# Patient Record
Sex: FEMALE | Race: WHITE | NOT HISPANIC OR LATINO | URBAN - METROPOLITAN AREA
[De-identification: names, ages, dates, MRNs, and addresses within clinical notes are randomized per-mention and may not be internally consistent; named-entity substitution may affect disease eponyms.]

---

## 2018-01-01 ENCOUNTER — OUTPATIENT (OUTPATIENT)
Dept: OUTPATIENT SERVICES | Facility: HOSPITAL | Age: 78
LOS: 1 days | End: 2018-01-01

## 2018-01-01 ENCOUNTER — INPATIENT (INPATIENT)
Facility: HOSPITAL | Age: 78
LOS: 4 days | Discharge: HOME | End: 2018-06-18
Admitting: FAMILY MEDICINE
Payer: MEDICARE

## 2018-01-01 ENCOUNTER — MOBILE ON CALL (OUTPATIENT)
Age: 78
End: 2018-01-01

## 2018-01-01 ENCOUNTER — EMERGENCY (EMERGENCY)
Facility: HOSPITAL | Age: 78
LOS: 1 days | End: 2018-01-01
Payer: MEDICARE

## 2018-01-01 ENCOUNTER — TRANSCRIPTION ENCOUNTER (OUTPATIENT)
Age: 78
End: 2018-01-01

## 2018-01-01 ENCOUNTER — INPATIENT (INPATIENT)
Facility: HOSPITAL | Age: 78
LOS: 0 days | Discharge: ANOTHER HOSPITAL | End: 2018-12-02
Payer: MEDICARE

## 2018-01-01 ENCOUNTER — INPATIENT (INPATIENT)
Facility: HOSPITAL | Age: 78
LOS: 5 days | DRG: 208 | End: 2018-12-08
Attending: HOSPITALIST | Admitting: HOSPITALIST
Payer: MEDICARE

## 2018-01-01 VITALS
OXYGEN SATURATION: 100 % | WEIGHT: 181.88 LBS | SYSTOLIC BLOOD PRESSURE: 115 MMHG | DIASTOLIC BLOOD PRESSURE: 70 MMHG | TEMPERATURE: 96 F | RESPIRATION RATE: 31 BRPM | HEART RATE: 126 BPM

## 2018-01-01 VITALS — RESPIRATION RATE: 14 BRPM | OXYGEN SATURATION: 89 %

## 2018-01-01 DIAGNOSIS — C85.90 NON-HODGKIN LYMPHOMA, UNSPECIFIED, UNSPECIFIED SITE: ICD-10-CM

## 2018-01-01 DIAGNOSIS — J95.821 ACUTE POSTPROCEDURAL RESPIRATORY FAILURE: ICD-10-CM

## 2018-01-01 DIAGNOSIS — R09.89 OTHER SPECIFIED SYMPTOMS AND SIGNS INVOLVING THE CIRCULATORY AND RESPIRATORY SYSTEMS: ICD-10-CM

## 2018-01-01 DIAGNOSIS — K72.00 ACUTE AND SUBACUTE HEPATIC FAILURE WITHOUT COMA: ICD-10-CM

## 2018-01-01 DIAGNOSIS — I26.99 OTHER PULMONARY EMBOLISM WITHOUT ACUTE COR PULMONALE: ICD-10-CM

## 2018-01-01 DIAGNOSIS — E87.2 ACIDOSIS: ICD-10-CM

## 2018-01-01 DIAGNOSIS — S36.039A UNSPECIFIED LACERATION OF SPLEEN, INITIAL ENCOUNTER: ICD-10-CM

## 2018-01-01 DIAGNOSIS — M35.00 SJOGREN SYNDROME, UNSPECIFIED: ICD-10-CM

## 2018-01-01 DIAGNOSIS — Z90.2 ACQUIRED ABSENCE OF LUNG [PART OF]: Chronic | ICD-10-CM

## 2018-01-01 DIAGNOSIS — N17.9 ACUTE KIDNEY FAILURE, UNSPECIFIED: ICD-10-CM

## 2018-01-01 DIAGNOSIS — A41.51 SEPSIS DUE TO ESCHERICHIA COLI [E. COLI]: ICD-10-CM

## 2018-01-01 DIAGNOSIS — R57.9 SHOCK, UNSPECIFIED: ICD-10-CM

## 2018-01-01 DIAGNOSIS — D62 ACUTE POSTHEMORRHAGIC ANEMIA: ICD-10-CM

## 2018-01-01 DIAGNOSIS — K66.1 HEMOPERITONEUM: ICD-10-CM

## 2018-01-01 DIAGNOSIS — S22.42XA MULTIPLE FRACTURES OF RIBS, LEFT SIDE, INITIAL ENCOUNTER FOR CLOSED FRACTURE: ICD-10-CM

## 2018-01-01 DIAGNOSIS — N39.0 URINARY TRACT INFECTION, SITE NOT SPECIFIED: ICD-10-CM

## 2018-01-01 DIAGNOSIS — J96.00 ACUTE RESPIRATORY FAILURE, UNSPECIFIED WHETHER WITH HYPOXIA OR HYPERCAPNIA: ICD-10-CM

## 2018-01-01 DIAGNOSIS — N17.0 ACUTE KIDNEY FAILURE WITH TUBULAR NECROSIS: ICD-10-CM

## 2018-01-01 DIAGNOSIS — R41.0 DISORIENTATION, UNSPECIFIED: ICD-10-CM

## 2018-01-01 DIAGNOSIS — I48.91 UNSPECIFIED ATRIAL FIBRILLATION: ICD-10-CM

## 2018-01-01 DIAGNOSIS — I27.82 CHRONIC PULMONARY EMBOLISM: ICD-10-CM

## 2018-01-01 DIAGNOSIS — I26.09 OTHER PULMONARY EMBOLISM WITH ACUTE COR PULMONALE: ICD-10-CM

## 2018-01-01 DIAGNOSIS — R74.0 NONSPECIFIC ELEVATION OF LEVELS OF TRANSAMINASE AND LACTIC ACID DEHYDROGENASE [LDH]: ICD-10-CM

## 2018-01-01 LAB
ALBUMIN SERPL ELPH-MCNC: 1.4 G/DL — LOW (ref 3.3–5.2)
ALBUMIN SERPL ELPH-MCNC: 1.5 G/DL — LOW (ref 3.3–5.2)
ALBUMIN SERPL ELPH-MCNC: 1.6 G/DL — LOW (ref 3.3–5.2)
ALBUMIN SERPL ELPH-MCNC: 1.7 G/DL — LOW (ref 3.3–5.2)
ALBUMIN SERPL ELPH-MCNC: 1.8 G/DL — LOW (ref 3.3–5.2)
ALBUMIN SERPL ELPH-MCNC: 1.9 G/DL — LOW (ref 3.3–5.2)
ALP SERPL-CCNC: 101 U/L — SIGNIFICANT CHANGE UP (ref 40–120)
ALP SERPL-CCNC: 105 U/L — SIGNIFICANT CHANGE UP (ref 40–120)
ALP SERPL-CCNC: 152 U/L — HIGH (ref 40–120)
ALP SERPL-CCNC: 191 U/L — HIGH (ref 40–120)
ALP SERPL-CCNC: 58 U/L — SIGNIFICANT CHANGE UP (ref 40–120)
ALP SERPL-CCNC: 58 U/L — SIGNIFICANT CHANGE UP (ref 40–120)
ALP SERPL-CCNC: 68 U/L — SIGNIFICANT CHANGE UP (ref 40–120)
ALP SERPL-CCNC: 69 U/L — SIGNIFICANT CHANGE UP (ref 40–120)
ALT FLD-CCNC: 1498 U/L — HIGH
ALT FLD-CCNC: 1607 U/L — HIGH
ALT FLD-CCNC: 1749 U/L — HIGH
ALT FLD-CCNC: 1885 U/L — HIGH
ALT FLD-CCNC: 1926 U/L — HIGH
ALT FLD-CCNC: 361 U/L — HIGH
ALT FLD-CCNC: 489 U/L — HIGH
ALT FLD-CCNC: 981 U/L — HIGH
AMMONIA BLD-MCNC: 38 UMOL/L — SIGNIFICANT CHANGE UP (ref 11–55)
AMMONIA BLD-MCNC: 48 UMOL/L — SIGNIFICANT CHANGE UP (ref 11–55)
AMMONIA BLD-MCNC: 59 UMOL/L — HIGH (ref 11–55)
AMYLASE FLD-CCNC: 250 U/L — SIGNIFICANT CHANGE UP
ANION GAP SERPL CALC-SCNC: 14 MMOL/L — SIGNIFICANT CHANGE UP (ref 5–17)
ANION GAP SERPL CALC-SCNC: 18 MMOL/L — HIGH (ref 5–17)
ANION GAP SERPL CALC-SCNC: 21 MMOL/L — HIGH (ref 5–17)
ANION GAP SERPL CALC-SCNC: 21 MMOL/L — HIGH (ref 5–17)
ANION GAP SERPL CALC-SCNC: 22 MMOL/L — HIGH (ref 5–17)
ANION GAP SERPL CALC-SCNC: 23 MMOL/L — HIGH (ref 5–17)
ANION GAP SERPL CALC-SCNC: 24 MMOL/L — HIGH (ref 5–17)
APAP SERPL-MCNC: <7.5 UG/ML — LOW (ref 10–26)
APPEARANCE UR: ABNORMAL
APTT BLD: 106.6 SEC — HIGH (ref 27.5–36.3)
APTT BLD: 108.7 SEC — HIGH (ref 27.5–36.3)
APTT BLD: 125 SEC — CRITICAL HIGH (ref 27.5–36.3)
APTT BLD: 144.6 SEC — CRITICAL HIGH (ref 27.5–36.3)
APTT BLD: 188.4 SEC — CRITICAL HIGH (ref 27.5–36.3)
APTT BLD: 36.8 SEC — HIGH (ref 27.5–36.3)
APTT BLD: 38.3 SEC — HIGH (ref 27.5–36.3)
APTT BLD: 39.6 SEC — HIGH (ref 27.5–36.3)
APTT BLD: 52.4 SEC — HIGH (ref 27.5–36.3)
APTT BLD: 54.4 SEC — HIGH (ref 27.5–36.3)
APTT BLD: 55.8 SEC — HIGH (ref 27.5–36.3)
APTT BLD: 59.1 SEC — HIGH (ref 27.5–36.3)
APTT BLD: 60.2 SEC — HIGH (ref 27.5–36.3)
APTT BLD: 60.7 SEC — HIGH (ref 27.5–36.3)
APTT BLD: 61.1 SEC — HIGH (ref 27.5–36.3)
APTT BLD: 63.7 SEC — HIGH (ref 27.5–36.3)
APTT BLD: 66.7 SEC — HIGH (ref 27.5–36.3)
APTT BLD: 68.2 SEC — HIGH (ref 27.5–36.3)
APTT BLD: 74.3 SEC — HIGH (ref 27.5–36.3)
APTT BLD: 76.1 SEC — HIGH (ref 27.5–36.3)
APTT BLD: >200 SEC — CRITICAL HIGH (ref 27.5–36.3)
AST SERPL-CCNC: 208 U/L — HIGH
AST SERPL-CCNC: 2165 U/L — HIGH
AST SERPL-CCNC: 329 U/L — HIGH
AST SERPL-CCNC: 3570 U/L — HIGH
AST SERPL-CCNC: 3782 U/L — HIGH
AST SERPL-CCNC: 4411 U/L — HIGH
AST SERPL-CCNC: 4895 U/L — HIGH
AST SERPL-CCNC: 985 U/L — HIGH
BACTERIA # UR AUTO: ABNORMAL
BASOPHILS # BLD AUTO: 0 K/UL — SIGNIFICANT CHANGE UP (ref 0–0.2)
BASOPHILS NFR BLD AUTO: 0 % — SIGNIFICANT CHANGE UP (ref 0–2)
BILIRUB DIRECT SERPL-MCNC: 3 MG/DL — HIGH (ref 0–0.3)
BILIRUB DIRECT SERPL-MCNC: 5.5 MG/DL — HIGH (ref 0–0.3)
BILIRUB INDIRECT FLD-MCNC: 0.7 MG/DL — SIGNIFICANT CHANGE UP (ref 0.2–1)
BILIRUB INDIRECT FLD-MCNC: 0.9 MG/DL — SIGNIFICANT CHANGE UP (ref 0.2–1)
BILIRUB SERPL-MCNC: 2 MG/DL — SIGNIFICANT CHANGE UP (ref 0.4–2)
BILIRUB SERPL-MCNC: 2.3 MG/DL — HIGH (ref 0.4–2)
BILIRUB SERPL-MCNC: 2.4 MG/DL — HIGH (ref 0.4–2)
BILIRUB SERPL-MCNC: 2.6 MG/DL — HIGH (ref 0.4–2)
BILIRUB SERPL-MCNC: 3.2 MG/DL — HIGH (ref 0.4–2)
BILIRUB SERPL-MCNC: 3.7 MG/DL — HIGH (ref 0.4–2)
BILIRUB SERPL-MCNC: 4.9 MG/DL — HIGH (ref 0.4–2)
BILIRUB SERPL-MCNC: 6.4 MG/DL — HIGH (ref 0.4–2)
BILIRUB SERPL-MCNC: 6.4 MG/DL — HIGH (ref 0.4–2)
BILIRUB UR-MCNC: ABNORMAL
BLD GP AB SCN SERPL QL: SIGNIFICANT CHANGE UP
BUN SERPL-MCNC: 28 MG/DL — HIGH (ref 8–20)
BUN SERPL-MCNC: 29 MG/DL — HIGH (ref 8–20)
BUN SERPL-MCNC: 31 MG/DL — HIGH (ref 8–20)
BUN SERPL-MCNC: 31 MG/DL — HIGH (ref 8–20)
BUN SERPL-MCNC: 37 MG/DL — HIGH (ref 8–20)
BUN SERPL-MCNC: 45 MG/DL — HIGH (ref 8–20)
BUN SERPL-MCNC: 54 MG/DL — HIGH (ref 8–20)
BUN SERPL-MCNC: 63 MG/DL — HIGH (ref 8–20)
BUN SERPL-MCNC: 68 MG/DL — HIGH (ref 8–20)
CALCIUM SERPL-MCNC: 6.2 MG/DL — CRITICAL LOW (ref 8.6–10.2)
CALCIUM SERPL-MCNC: 6.3 MG/DL — CRITICAL LOW (ref 8.6–10.2)
CALCIUM SERPL-MCNC: 6.6 MG/DL — LOW (ref 8.6–10.2)
CALCIUM SERPL-MCNC: 6.8 MG/DL — LOW (ref 8.6–10.2)
CALCIUM SERPL-MCNC: 7.1 MG/DL — LOW (ref 8.6–10.2)
CALCIUM SERPL-MCNC: 7.7 MG/DL — LOW (ref 8.6–10.2)
CALCIUM SERPL-MCNC: 8.5 MG/DL — LOW (ref 8.6–10.2)
CALCIUM SERPL-MCNC: 8.6 MG/DL — SIGNIFICANT CHANGE UP (ref 8.6–10.2)
CALCIUM SERPL-MCNC: 8.7 MG/DL — SIGNIFICANT CHANGE UP (ref 8.6–10.2)
CHLORIDE SERPL-SCNC: 100 MMOL/L — SIGNIFICANT CHANGE UP (ref 98–107)
CHLORIDE SERPL-SCNC: 101 MMOL/L — SIGNIFICANT CHANGE UP (ref 98–107)
CHLORIDE SERPL-SCNC: 101 MMOL/L — SIGNIFICANT CHANGE UP (ref 98–107)
CHLORIDE SERPL-SCNC: 106 MMOL/L — SIGNIFICANT CHANGE UP (ref 98–107)
CHLORIDE SERPL-SCNC: 106 MMOL/L — SIGNIFICANT CHANGE UP (ref 98–107)
CHLORIDE SERPL-SCNC: 93 MMOL/L — LOW (ref 98–107)
CHLORIDE SERPL-SCNC: 95 MMOL/L — LOW (ref 98–107)
CHLORIDE SERPL-SCNC: 96 MMOL/L — LOW (ref 98–107)
CHLORIDE SERPL-SCNC: 97 MMOL/L — LOW (ref 98–107)
CK MB CFR SERPL CALC: 12.8 NG/ML — HIGH (ref 0–6.7)
CK SERPL-CCNC: 810 U/L — HIGH (ref 25–170)
CO2 SERPL-SCNC: 19 MMOL/L — LOW (ref 22–29)
CO2 SERPL-SCNC: 19 MMOL/L — LOW (ref 22–29)
CO2 SERPL-SCNC: 20 MMOL/L — LOW (ref 22–29)
CO2 SERPL-SCNC: 21 MMOL/L — LOW (ref 22–29)
CO2 SERPL-SCNC: 22 MMOL/L — SIGNIFICANT CHANGE UP (ref 22–29)
CO2 SERPL-SCNC: 22 MMOL/L — SIGNIFICANT CHANGE UP (ref 22–29)
CO2 SERPL-SCNC: 24 MMOL/L — SIGNIFICANT CHANGE UP (ref 22–29)
COLOR SPEC: ABNORMAL
CREAT SERPL-MCNC: 1.55 MG/DL — HIGH (ref 0.5–1.3)
CREAT SERPL-MCNC: 1.59 MG/DL — HIGH (ref 0.5–1.3)
CREAT SERPL-MCNC: 1.72 MG/DL — HIGH (ref 0.5–1.3)
CREAT SERPL-MCNC: 1.87 MG/DL — HIGH (ref 0.5–1.3)
CREAT SERPL-MCNC: 2.1 MG/DL — HIGH (ref 0.5–1.3)
CREAT SERPL-MCNC: 2.3 MG/DL — HIGH (ref 0.5–1.3)
CREAT SERPL-MCNC: 2.47 MG/DL — HIGH (ref 0.5–1.3)
CREAT SERPL-MCNC: 2.59 MG/DL — HIGH (ref 0.5–1.3)
CREAT SERPL-MCNC: 2.69 MG/DL — HIGH (ref 0.5–1.3)
CULTURE RESULTS: NO GROWTH — SIGNIFICANT CHANGE UP
CULTURE RESULTS: SIGNIFICANT CHANGE UP
CULTURE RESULTS: SIGNIFICANT CHANGE UP
DIFF PNL FLD: ABNORMAL
EOSINOPHIL # BLD AUTO: 0 K/UL — SIGNIFICANT CHANGE UP (ref 0–0.5)
EOSINOPHIL NFR BLD AUTO: 0 % — SIGNIFICANT CHANGE UP (ref 0–6)
EPI CELLS # UR: SIGNIFICANT CHANGE UP
FIBRINOGEN PPP-MCNC: 492 MG/DL — SIGNIFICANT CHANGE UP (ref 350–510)
GAS PNL BLDA: SIGNIFICANT CHANGE UP
GLUCOSE BLDC GLUCOMTR-MCNC: 106 MG/DL — HIGH (ref 70–99)
GLUCOSE BLDC GLUCOMTR-MCNC: 107 MG/DL — HIGH (ref 70–99)
GLUCOSE BLDC GLUCOMTR-MCNC: 112 MG/DL — HIGH (ref 70–99)
GLUCOSE BLDC GLUCOMTR-MCNC: 113 MG/DL — HIGH (ref 70–99)
GLUCOSE BLDC GLUCOMTR-MCNC: 114 MG/DL — HIGH (ref 70–99)
GLUCOSE BLDC GLUCOMTR-MCNC: 116 MG/DL — HIGH (ref 70–99)
GLUCOSE BLDC GLUCOMTR-MCNC: 116 MG/DL — HIGH (ref 70–99)
GLUCOSE BLDC GLUCOMTR-MCNC: 119 MG/DL — HIGH (ref 70–99)
GLUCOSE BLDC GLUCOMTR-MCNC: 119 MG/DL — HIGH (ref 70–99)
GLUCOSE BLDC GLUCOMTR-MCNC: 120 MG/DL — HIGH (ref 70–99)
GLUCOSE BLDC GLUCOMTR-MCNC: 120 MG/DL — HIGH (ref 70–99)
GLUCOSE BLDC GLUCOMTR-MCNC: 121 MG/DL — HIGH (ref 70–99)
GLUCOSE BLDC GLUCOMTR-MCNC: 123 MG/DL — HIGH (ref 70–99)
GLUCOSE BLDC GLUCOMTR-MCNC: 125 MG/DL — HIGH (ref 70–99)
GLUCOSE BLDC GLUCOMTR-MCNC: 126 MG/DL — HIGH (ref 70–99)
GLUCOSE BLDC GLUCOMTR-MCNC: 127 MG/DL — HIGH (ref 70–99)
GLUCOSE BLDC GLUCOMTR-MCNC: 129 MG/DL — HIGH (ref 70–99)
GLUCOSE BLDC GLUCOMTR-MCNC: 132 MG/DL — HIGH (ref 70–99)
GLUCOSE BLDC GLUCOMTR-MCNC: 133 MG/DL — HIGH (ref 70–99)
GLUCOSE BLDC GLUCOMTR-MCNC: 134 MG/DL — HIGH (ref 70–99)
GLUCOSE BLDC GLUCOMTR-MCNC: 135 MG/DL — HIGH (ref 70–99)
GLUCOSE BLDC GLUCOMTR-MCNC: 137 MG/DL — HIGH (ref 70–99)
GLUCOSE BLDC GLUCOMTR-MCNC: 138 MG/DL — HIGH (ref 70–99)
GLUCOSE BLDC GLUCOMTR-MCNC: 139 MG/DL — HIGH (ref 70–99)
GLUCOSE BLDC GLUCOMTR-MCNC: 141 MG/DL — HIGH (ref 70–99)
GLUCOSE BLDC GLUCOMTR-MCNC: 146 MG/DL — HIGH (ref 70–99)
GLUCOSE BLDC GLUCOMTR-MCNC: 148 MG/DL — HIGH (ref 70–99)
GLUCOSE BLDC GLUCOMTR-MCNC: 150 MG/DL — HIGH (ref 70–99)
GLUCOSE BLDC GLUCOMTR-MCNC: 153 MG/DL — HIGH (ref 70–99)
GLUCOSE BLDC GLUCOMTR-MCNC: 159 MG/DL — HIGH (ref 70–99)
GLUCOSE BLDC GLUCOMTR-MCNC: 168 MG/DL — HIGH (ref 70–99)
GLUCOSE BLDC GLUCOMTR-MCNC: 168 MG/DL — HIGH (ref 70–99)
GLUCOSE BLDC GLUCOMTR-MCNC: 178 MG/DL — HIGH (ref 70–99)
GLUCOSE BLDC GLUCOMTR-MCNC: 223 MG/DL — HIGH (ref 70–99)
GLUCOSE BLDC GLUCOMTR-MCNC: 55 MG/DL — LOW (ref 70–99)
GLUCOSE BLDC GLUCOMTR-MCNC: 58 MG/DL — LOW (ref 70–99)
GLUCOSE BLDC GLUCOMTR-MCNC: 66 MG/DL — LOW (ref 70–99)
GLUCOSE BLDC GLUCOMTR-MCNC: 68 MG/DL — LOW (ref 70–99)
GLUCOSE BLDC GLUCOMTR-MCNC: 72 MG/DL — SIGNIFICANT CHANGE UP (ref 70–99)
GLUCOSE BLDC GLUCOMTR-MCNC: 83 MG/DL — SIGNIFICANT CHANGE UP (ref 70–99)
GLUCOSE BLDC GLUCOMTR-MCNC: 83 MG/DL — SIGNIFICANT CHANGE UP (ref 70–99)
GLUCOSE BLDC GLUCOMTR-MCNC: 84 MG/DL — SIGNIFICANT CHANGE UP (ref 70–99)
GLUCOSE BLDC GLUCOMTR-MCNC: 88 MG/DL — SIGNIFICANT CHANGE UP (ref 70–99)
GLUCOSE BLDC GLUCOMTR-MCNC: 91 MG/DL — SIGNIFICANT CHANGE UP (ref 70–99)
GLUCOSE BLDC GLUCOMTR-MCNC: 92 MG/DL — SIGNIFICANT CHANGE UP (ref 70–99)
GLUCOSE BLDC GLUCOMTR-MCNC: 94 MG/DL — SIGNIFICANT CHANGE UP (ref 70–99)
GLUCOSE BLDC GLUCOMTR-MCNC: 97 MG/DL — SIGNIFICANT CHANGE UP (ref 70–99)
GLUCOSE BLDC GLUCOMTR-MCNC: 99 MG/DL — SIGNIFICANT CHANGE UP (ref 70–99)
GLUCOSE BLDC GLUCOMTR-MCNC: 99 MG/DL — SIGNIFICANT CHANGE UP (ref 70–99)
GLUCOSE SERPL-MCNC: 131 MG/DL — HIGH (ref 70–115)
GLUCOSE SERPL-MCNC: 154 MG/DL — HIGH (ref 70–115)
GLUCOSE SERPL-MCNC: 158 MG/DL — HIGH (ref 70–115)
GLUCOSE SERPL-MCNC: 185 MG/DL — HIGH (ref 70–115)
GLUCOSE SERPL-MCNC: 243 MG/DL — HIGH (ref 70–115)
GLUCOSE SERPL-MCNC: 57 MG/DL — LOW (ref 70–115)
GLUCOSE SERPL-MCNC: 65 MG/DL — LOW (ref 70–115)
GLUCOSE SERPL-MCNC: 76 MG/DL — SIGNIFICANT CHANGE UP (ref 70–115)
GLUCOSE SERPL-MCNC: 80 MG/DL — SIGNIFICANT CHANGE UP (ref 70–115)
GLUCOSE UR QL: 50 MG/DL
GRAN CASTS # UR COMP ASSIST: ABNORMAL /LPF
HAV IGM SER-ACNC: SIGNIFICANT CHANGE UP
HBA1C BLD-MCNC: 5.3 % — SIGNIFICANT CHANGE UP (ref 4–5.6)
HBV CORE IGM SER-ACNC: SIGNIFICANT CHANGE UP
HBV SURFACE AG SER-ACNC: SIGNIFICANT CHANGE UP
HCT VFR BLD CALC: 21 % — CRITICAL LOW (ref 37–47)
HCT VFR BLD CALC: 22.9 % — LOW (ref 37–47)
HCT VFR BLD CALC: 23.2 % — LOW (ref 37–47)
HCT VFR BLD CALC: 25.6 % — LOW (ref 37–47)
HCT VFR BLD CALC: 25.8 % — LOW (ref 37–47)
HCT VFR BLD CALC: 26.5 % — LOW (ref 37–47)
HCT VFR BLD CALC: 26.6 % — LOW (ref 37–47)
HCT VFR BLD CALC: 27.9 % — LOW (ref 37–47)
HCT VFR BLD CALC: 29.8 % — LOW (ref 37–47)
HCT VFR BLD CALC: 30.7 % — LOW (ref 37–47)
HCT VFR BLD CALC: 31.9 % — LOW (ref 37–47)
HCV AB S/CO SERPL IA: 0.12 S/CO — SIGNIFICANT CHANGE UP
HCV AB SERPL-IMP: SIGNIFICANT CHANGE UP
HGB BLD-MCNC: 10 G/DL — LOW (ref 12–16)
HGB BLD-MCNC: 10.2 G/DL — LOW (ref 12–16)
HGB BLD-MCNC: 10.5 G/DL — LOW (ref 12–16)
HGB BLD-MCNC: 7 G/DL — CRITICAL LOW (ref 12–16)
HGB BLD-MCNC: 7.8 G/DL — LOW (ref 12–16)
HGB BLD-MCNC: 7.9 G/DL — LOW (ref 12–16)
HGB BLD-MCNC: 8.4 G/DL — LOW (ref 12–16)
HGB BLD-MCNC: 8.6 G/DL — LOW (ref 12–16)
HGB BLD-MCNC: 9.1 G/DL — LOW (ref 12–16)
INR BLD: 1.09 RATIO — SIGNIFICANT CHANGE UP (ref 0.88–1.16)
INR BLD: 1.17 RATIO — HIGH (ref 0.88–1.16)
INR BLD: 1.19 RATIO — HIGH (ref 0.88–1.16)
INR BLD: 1.44 RATIO — HIGH (ref 0.88–1.16)
INR BLD: 3.8 RATIO — HIGH (ref 0.88–1.16)
INR BLD: 3.89 RATIO — HIGH (ref 0.88–1.16)
INR BLD: 4.01 RATIO — HIGH (ref 0.88–1.16)
KETONES UR-MCNC: ABNORMAL
LACTATE SERPL-SCNC: 2.4 MMOL/L — HIGH (ref 0.5–2)
LACTATE SERPL-SCNC: 4 MMOL/L — CRITICAL HIGH (ref 0.5–2)
LEUKOCYTE ESTERASE UR-ACNC: ABNORMAL
LYMPHOCYTES # BLD AUTO: 0.8 K/UL — LOW (ref 1–4.8)
LYMPHOCYTES # BLD AUTO: 1.6 K/UL — SIGNIFICANT CHANGE UP (ref 1–4.8)
LYMPHOCYTES # BLD AUTO: 10 % — LOW (ref 20–55)
LYMPHOCYTES # BLD AUTO: 8 % — LOW (ref 20–55)
MAGNESIUM SERPL-MCNC: 1.3 MG/DL — LOW (ref 1.6–2.6)
MAGNESIUM SERPL-MCNC: 1.7 MG/DL — LOW (ref 1.8–2.6)
MAGNESIUM SERPL-MCNC: 1.9 MG/DL — SIGNIFICANT CHANGE UP (ref 1.6–2.6)
MAGNESIUM SERPL-MCNC: 2.1 MG/DL — SIGNIFICANT CHANGE UP (ref 1.6–2.6)
MAGNESIUM SERPL-MCNC: 2.2 MG/DL — SIGNIFICANT CHANGE UP (ref 1.6–2.6)
MAGNESIUM SERPL-MCNC: 2.3 MG/DL — SIGNIFICANT CHANGE UP (ref 1.6–2.6)
MAGNESIUM SERPL-MCNC: 2.3 MG/DL — SIGNIFICANT CHANGE UP (ref 1.6–2.6)
MCHC RBC-ENTMCNC: 28.3 PG — SIGNIFICANT CHANGE UP (ref 27–31)
MCHC RBC-ENTMCNC: 28.3 PG — SIGNIFICANT CHANGE UP (ref 27–31)
MCHC RBC-ENTMCNC: 28.4 PG — SIGNIFICANT CHANGE UP (ref 27–31)
MCHC RBC-ENTMCNC: 28.5 PG — SIGNIFICANT CHANGE UP (ref 27–31)
MCHC RBC-ENTMCNC: 28.6 PG — SIGNIFICANT CHANGE UP (ref 27–31)
MCHC RBC-ENTMCNC: 28.6 PG — SIGNIFICANT CHANGE UP (ref 27–31)
MCHC RBC-ENTMCNC: 28.8 PG — SIGNIFICANT CHANGE UP (ref 27–31)
MCHC RBC-ENTMCNC: 28.9 PG — SIGNIFICANT CHANGE UP (ref 27–31)
MCHC RBC-ENTMCNC: 29.1 PG — SIGNIFICANT CHANGE UP (ref 27–31)
MCHC RBC-ENTMCNC: 29.1 PG — SIGNIFICANT CHANGE UP (ref 27–31)
MCHC RBC-ENTMCNC: 29.2 PG — SIGNIFICANT CHANGE UP (ref 27–31)
MCHC RBC-ENTMCNC: 32.6 G/DL — SIGNIFICANT CHANGE UP (ref 32–36)
MCHC RBC-ENTMCNC: 32.8 G/DL — SIGNIFICANT CHANGE UP (ref 32–36)
MCHC RBC-ENTMCNC: 32.9 G/DL — SIGNIFICANT CHANGE UP (ref 32–36)
MCHC RBC-ENTMCNC: 33.2 G/DL — SIGNIFICANT CHANGE UP (ref 32–36)
MCHC RBC-ENTMCNC: 33.3 G/DL — SIGNIFICANT CHANGE UP (ref 32–36)
MCHC RBC-ENTMCNC: 33.3 G/DL — SIGNIFICANT CHANGE UP (ref 32–36)
MCHC RBC-ENTMCNC: 33.6 G/DL — SIGNIFICANT CHANGE UP (ref 32–36)
MCHC RBC-ENTMCNC: 34.1 G/DL — SIGNIFICANT CHANGE UP (ref 32–36)
MCHC RBC-ENTMCNC: 34.1 G/DL — SIGNIFICANT CHANGE UP (ref 32–36)
MCHC RBC-ENTMCNC: 34.2 G/DL — SIGNIFICANT CHANGE UP (ref 32–36)
MCHC RBC-ENTMCNC: 34.3 G/DL — SIGNIFICANT CHANGE UP (ref 32–36)
MCV RBC AUTO: 83.6 FL — SIGNIFICANT CHANGE UP (ref 81–99)
MCV RBC AUTO: 84.7 FL — SIGNIFICANT CHANGE UP (ref 81–99)
MCV RBC AUTO: 84.7 FL — SIGNIFICANT CHANGE UP (ref 81–99)
MCV RBC AUTO: 85 FL — SIGNIFICANT CHANGE UP (ref 81–99)
MCV RBC AUTO: 85 FL — SIGNIFICANT CHANGE UP (ref 81–99)
MCV RBC AUTO: 85.7 FL — SIGNIFICANT CHANGE UP (ref 81–99)
MCV RBC AUTO: 85.8 FL — SIGNIFICANT CHANGE UP (ref 81–99)
MCV RBC AUTO: 86.2 FL — SIGNIFICANT CHANGE UP (ref 81–99)
MCV RBC AUTO: 87.2 FL — SIGNIFICANT CHANGE UP (ref 81–99)
MCV RBC AUTO: 87.5 FL — SIGNIFICANT CHANGE UP (ref 81–99)
MCV RBC AUTO: 87.6 FL — SIGNIFICANT CHANGE UP (ref 81–99)
MONOCYTES # BLD AUTO: 0.3 K/UL — SIGNIFICANT CHANGE UP (ref 0–0.8)
MONOCYTES # BLD AUTO: 0.4 K/UL — SIGNIFICANT CHANGE UP (ref 0–0.8)
MONOCYTES NFR BLD AUTO: 3 % — SIGNIFICANT CHANGE UP (ref 3–10)
MONOCYTES NFR BLD AUTO: 8 % — SIGNIFICANT CHANGE UP (ref 3–10)
NEUTROPHILS # BLD AUTO: 19.8 K/UL — HIGH (ref 1.8–8)
NEUTROPHILS # BLD AUTO: 7.3 K/UL — SIGNIFICANT CHANGE UP (ref 1.8–8)
NEUTROPHILS NFR BLD AUTO: 62 % — SIGNIFICANT CHANGE UP (ref 37–73)
NEUTROPHILS NFR BLD AUTO: 82 % — HIGH (ref 37–73)
NITRITE UR-MCNC: NEGATIVE — SIGNIFICANT CHANGE UP
PH UR: 6 — SIGNIFICANT CHANGE UP (ref 5–8)
PHOSPHATE SERPL-MCNC: 3.7 MG/DL — SIGNIFICANT CHANGE UP (ref 2.4–4.7)
PHOSPHATE SERPL-MCNC: 3.9 MG/DL — SIGNIFICANT CHANGE UP (ref 2.4–4.7)
PHOSPHATE SERPL-MCNC: 4.4 MG/DL — SIGNIFICANT CHANGE UP (ref 2.4–4.7)
PHOSPHATE SERPL-MCNC: 4.5 MG/DL — SIGNIFICANT CHANGE UP (ref 2.4–4.7)
PHOSPHATE SERPL-MCNC: 5.1 MG/DL — HIGH (ref 2.4–4.7)
PHOSPHATE SERPL-MCNC: 5.3 MG/DL — HIGH (ref 2.4–4.7)
PHOSPHATE SERPL-MCNC: 5.5 MG/DL — HIGH (ref 2.4–4.7)
PHOSPHATE SERPL-MCNC: 5.9 MG/DL — HIGH (ref 2.4–4.7)
PHOSPHATE SERPL-MCNC: 6 MG/DL — HIGH (ref 2.4–4.7)
PLATELET # BLD AUTO: 146 K/UL — LOW (ref 150–400)
PLATELET # BLD AUTO: 150 K/UL — SIGNIFICANT CHANGE UP (ref 150–400)
PLATELET # BLD AUTO: 152 K/UL — SIGNIFICANT CHANGE UP (ref 150–400)
PLATELET # BLD AUTO: 153 K/UL — SIGNIFICANT CHANGE UP (ref 150–400)
PLATELET # BLD AUTO: 157 K/UL — SIGNIFICANT CHANGE UP (ref 150–400)
PLATELET # BLD AUTO: 158 K/UL — SIGNIFICANT CHANGE UP (ref 150–400)
PLATELET # BLD AUTO: 166 K/UL — SIGNIFICANT CHANGE UP (ref 150–400)
PLATELET # BLD AUTO: 172 K/UL — SIGNIFICANT CHANGE UP (ref 150–400)
PLATELET # BLD AUTO: 177 K/UL — SIGNIFICANT CHANGE UP (ref 150–400)
PLATELET # BLD AUTO: 191 K/UL — SIGNIFICANT CHANGE UP (ref 150–400)
PLATELET # BLD AUTO: 196 K/UL — SIGNIFICANT CHANGE UP (ref 150–400)
POTASSIUM SERPL-MCNC: 2.7 MMOL/L — CRITICAL LOW (ref 3.5–5.3)
POTASSIUM SERPL-MCNC: 3.2 MMOL/L — LOW (ref 3.5–5.3)
POTASSIUM SERPL-MCNC: 3.3 MMOL/L — LOW (ref 3.5–5.3)
POTASSIUM SERPL-MCNC: 3.8 MMOL/L — SIGNIFICANT CHANGE UP (ref 3.5–5.3)
POTASSIUM SERPL-MCNC: 4.2 MMOL/L — SIGNIFICANT CHANGE UP (ref 3.5–5.3)
POTASSIUM SERPL-MCNC: 4.4 MMOL/L — SIGNIFICANT CHANGE UP (ref 3.5–5.3)
POTASSIUM SERPL-MCNC: 4.4 MMOL/L — SIGNIFICANT CHANGE UP (ref 3.5–5.3)
POTASSIUM SERPL-MCNC: 4.5 MMOL/L — SIGNIFICANT CHANGE UP (ref 3.5–5.3)
POTASSIUM SERPL-MCNC: 4.6 MMOL/L — SIGNIFICANT CHANGE UP (ref 3.5–5.3)
POTASSIUM SERPL-SCNC: 2.7 MMOL/L — CRITICAL LOW (ref 3.5–5.3)
POTASSIUM SERPL-SCNC: 3.2 MMOL/L — LOW (ref 3.5–5.3)
POTASSIUM SERPL-SCNC: 3.3 MMOL/L — LOW (ref 3.5–5.3)
POTASSIUM SERPL-SCNC: 3.8 MMOL/L — SIGNIFICANT CHANGE UP (ref 3.5–5.3)
POTASSIUM SERPL-SCNC: 4.2 MMOL/L — SIGNIFICANT CHANGE UP (ref 3.5–5.3)
POTASSIUM SERPL-SCNC: 4.4 MMOL/L — SIGNIFICANT CHANGE UP (ref 3.5–5.3)
POTASSIUM SERPL-SCNC: 4.4 MMOL/L — SIGNIFICANT CHANGE UP (ref 3.5–5.3)
POTASSIUM SERPL-SCNC: 4.5 MMOL/L — SIGNIFICANT CHANGE UP (ref 3.5–5.3)
POTASSIUM SERPL-SCNC: 4.6 MMOL/L — SIGNIFICANT CHANGE UP (ref 3.5–5.3)
PROCALCITONIN SERPL-MCNC: 20.44 NG/ML — HIGH (ref 0–0.04)
PROT SERPL-MCNC: 3 G/DL — LOW (ref 6.6–8.7)
PROT SERPL-MCNC: 3 G/DL — LOW (ref 6.6–8.7)
PROT SERPL-MCNC: 3.1 G/DL — LOW (ref 6.6–8.7)
PROT SERPL-MCNC: 3.2 G/DL — LOW (ref 6.6–8.7)
PROT SERPL-MCNC: 3.3 G/DL — LOW (ref 6.6–8.7)
PROT SERPL-MCNC: 3.3 G/DL — LOW (ref 6.6–8.7)
PROT SERPL-MCNC: 3.7 G/DL — LOW (ref 6.6–8.7)
PROT SERPL-MCNC: 4.4 G/DL — LOW (ref 6.6–8.7)
PROT UR-MCNC: 100 MG/DL
PROTHROM AB SERPL-ACNC: 12.6 SEC — SIGNIFICANT CHANGE UP (ref 10–12.9)
PROTHROM AB SERPL-ACNC: 13.5 SEC — HIGH (ref 10–12.9)
PROTHROM AB SERPL-ACNC: 13.8 SEC — HIGH (ref 10–12.9)
PROTHROM AB SERPL-ACNC: 16.7 SEC — HIGH (ref 10–12.9)
PROTHROM AB SERPL-ACNC: 45.6 SEC — HIGH (ref 10–12.9)
PROTHROM AB SERPL-ACNC: 46.6 SEC — HIGH (ref 10–12.9)
PROTHROM AB SERPL-ACNC: 48.2 SEC — HIGH (ref 10–12.9)
RBC # BLD: 2.45 M/UL — LOW (ref 4.4–5.2)
RBC # BLD: 2.67 M/UL — LOW (ref 4.4–5.2)
RBC # BLD: 2.73 M/UL — LOW (ref 4.4–5.2)
RBC # BLD: 2.96 M/UL — LOW (ref 4.4–5.2)
RBC # BLD: 2.97 M/UL — LOW (ref 4.4–5.2)
RBC # BLD: 3.13 M/UL — LOW (ref 4.4–5.2)
RBC # BLD: 3.18 M/UL — LOW (ref 4.4–5.2)
RBC # BLD: 3.19 M/UL — LOW (ref 4.4–5.2)
RBC # BLD: 3.52 M/UL — LOW (ref 4.4–5.2)
RBC # BLD: 3.61 M/UL — LOW (ref 4.4–5.2)
RBC # BLD: 3.64 M/UL — LOW (ref 4.4–5.2)
RBC # FLD: 16.2 % — HIGH (ref 11–15.6)
RBC # FLD: 16.8 % — HIGH (ref 11–15.6)
RBC # FLD: 17.1 % — HIGH (ref 11–15.6)
RBC # FLD: 17.2 % — HIGH (ref 11–15.6)
RBC # FLD: 17.7 % — HIGH (ref 11–15.6)
RBC # FLD: 17.8 % — HIGH (ref 11–15.6)
RBC # FLD: 17.9 % — HIGH (ref 11–15.6)
RBC # FLD: 18 % — HIGH (ref 11–15.6)
RBC # FLD: 18 % — HIGH (ref 11–15.6)
RBC CASTS # UR COMP ASSIST: ABNORMAL /HPF (ref 0–4)
SODIUM SERPL-SCNC: 136 MMOL/L — SIGNIFICANT CHANGE UP (ref 135–145)
SODIUM SERPL-SCNC: 137 MMOL/L — SIGNIFICANT CHANGE UP (ref 135–145)
SODIUM SERPL-SCNC: 140 MMOL/L — SIGNIFICANT CHANGE UP (ref 135–145)
SODIUM SERPL-SCNC: 140 MMOL/L — SIGNIFICANT CHANGE UP (ref 135–145)
SODIUM SERPL-SCNC: 141 MMOL/L — SIGNIFICANT CHANGE UP (ref 135–145)
SODIUM SERPL-SCNC: 142 MMOL/L — SIGNIFICANT CHANGE UP (ref 135–145)
SODIUM SERPL-SCNC: 143 MMOL/L — SIGNIFICANT CHANGE UP (ref 135–145)
SODIUM SERPL-SCNC: 145 MMOL/L — SIGNIFICANT CHANGE UP (ref 135–145)
SODIUM SERPL-SCNC: 147 MMOL/L — HIGH (ref 135–145)
SP GR SPEC: 1.01 — SIGNIFICANT CHANGE UP (ref 1.01–1.02)
SPECIMEN SOURCE: SIGNIFICANT CHANGE UP
TROPONIN T SERPL-MCNC: 0.06 NG/ML — SIGNIFICANT CHANGE UP (ref 0–0.06)
TYPE + AB SCN PNL BLD: SIGNIFICANT CHANGE UP
TYPE + AB SCN PNL BLD: SIGNIFICANT CHANGE UP
UROBILINOGEN FLD QL: 4 MG/DL
WBC # BLD: 10.6 K/UL — SIGNIFICANT CHANGE UP (ref 4.8–10.8)
WBC # BLD: 12.8 K/UL — HIGH (ref 4.8–10.8)
WBC # BLD: 13.2 K/UL — HIGH (ref 4.8–10.8)
WBC # BLD: 14.9 K/UL — HIGH (ref 4.8–10.8)
WBC # BLD: 15.4 K/UL — HIGH (ref 4.8–10.8)
WBC # BLD: 17.1 K/UL — HIGH (ref 4.8–10.8)
WBC # BLD: 17.3 K/UL — HIGH (ref 4.8–10.8)
WBC # BLD: 20.6 K/UL — HIGH (ref 4.8–10.8)
WBC # BLD: 21.4 K/UL — HIGH (ref 4.8–10.8)
WBC # BLD: 25.4 K/UL — HIGH (ref 4.8–10.8)
WBC # BLD: 8.7 K/UL — SIGNIFICANT CHANGE UP (ref 4.8–10.8)
WBC # FLD AUTO: 10.6 K/UL — SIGNIFICANT CHANGE UP (ref 4.8–10.8)
WBC # FLD AUTO: 12.8 K/UL — HIGH (ref 4.8–10.8)
WBC # FLD AUTO: 13.2 K/UL — HIGH (ref 4.8–10.8)
WBC # FLD AUTO: 14.9 K/UL — HIGH (ref 4.8–10.8)
WBC # FLD AUTO: 15.4 K/UL — HIGH (ref 4.8–10.8)
WBC # FLD AUTO: 17.1 K/UL — HIGH (ref 4.8–10.8)
WBC # FLD AUTO: 17.3 K/UL — HIGH (ref 4.8–10.8)
WBC # FLD AUTO: 20.6 K/UL — HIGH (ref 4.8–10.8)
WBC # FLD AUTO: 21.4 K/UL — HIGH (ref 4.8–10.8)
WBC # FLD AUTO: 25.4 K/UL — HIGH (ref 4.8–10.8)
WBC # FLD AUTO: 8.7 K/UL — SIGNIFICANT CHANGE UP (ref 4.8–10.8)
WBC UR QL: ABNORMAL

## 2018-01-01 PROCEDURE — 86923 COMPATIBILITY TEST ELECTRIC: CPT

## 2018-01-01 PROCEDURE — 93005 ELECTROCARDIOGRAM TRACING: CPT

## 2018-01-01 PROCEDURE — 80076 HEPATIC FUNCTION PANEL: CPT

## 2018-01-01 PROCEDURE — 84132 ASSAY OF SERUM POTASSIUM: CPT

## 2018-01-01 PROCEDURE — 82140 ASSAY OF AMMONIA: CPT

## 2018-01-01 PROCEDURE — 71045 X-RAY EXAM CHEST 1 VIEW: CPT | Mod: 26

## 2018-01-01 PROCEDURE — 82803 BLOOD GASES ANY COMBINATION: CPT

## 2018-01-01 PROCEDURE — 74177 CT ABD & PELVIS W/CONTRAST: CPT | Mod: 26

## 2018-01-01 PROCEDURE — 99291 CRITICAL CARE FIRST HOUR: CPT

## 2018-01-01 PROCEDURE — 71045 X-RAY EXAM CHEST 1 VIEW: CPT

## 2018-01-01 PROCEDURE — 97110 THERAPEUTIC EXERCISES: CPT

## 2018-01-01 PROCEDURE — 74176 CT ABD & PELVIS W/O CONTRAST: CPT | Mod: 26

## 2018-01-01 PROCEDURE — 83605 ASSAY OF LACTIC ACID: CPT

## 2018-01-01 PROCEDURE — 76700 US EXAM ABDOM COMPLETE: CPT | Mod: 26

## 2018-01-01 PROCEDURE — 93312 ECHO TRANSESOPHAGEAL: CPT

## 2018-01-01 PROCEDURE — 99233 SBSQ HOSP IP/OBS HIGH 50: CPT

## 2018-01-01 PROCEDURE — 85730 THROMBOPLASTIN TIME PARTIAL: CPT

## 2018-01-01 PROCEDURE — 74178 CT ABD&PLV WO CNTR FLWD CNTR: CPT | Mod: 26

## 2018-01-01 PROCEDURE — 82962 GLUCOSE BLOOD TEST: CPT

## 2018-01-01 PROCEDURE — 85610 PROTHROMBIN TIME: CPT

## 2018-01-01 PROCEDURE — 36415 COLL VENOUS BLD VENIPUNCTURE: CPT

## 2018-01-01 PROCEDURE — 92950 HEART/LUNG RESUSCITATION CPR: CPT

## 2018-01-01 PROCEDURE — 93010 ELECTROCARDIOGRAM REPORT: CPT

## 2018-01-01 PROCEDURE — 83036 HEMOGLOBIN GLYCOSYLATED A1C: CPT

## 2018-01-01 PROCEDURE — 76700 US EXAM ABDOM COMPLETE: CPT

## 2018-01-01 PROCEDURE — 99292 CRITICAL CARE ADDL 30 MIN: CPT

## 2018-01-01 PROCEDURE — 82248 BILIRUBIN DIRECT: CPT

## 2018-01-01 PROCEDURE — 84145 PROCALCITONIN (PCT): CPT

## 2018-01-01 PROCEDURE — 99284 EMERGENCY DEPT VISIT MOD MDM: CPT

## 2018-01-01 PROCEDURE — 93325 DOPPLER ECHO COLOR FLOW MAPG: CPT | Mod: 26

## 2018-01-01 PROCEDURE — 82150 ASSAY OF AMYLASE: CPT

## 2018-01-01 PROCEDURE — 85027 COMPLETE CBC AUTOMATED: CPT

## 2018-01-01 PROCEDURE — 93970 EXTREMITY STUDY: CPT

## 2018-01-01 PROCEDURE — 70450 CT HEAD/BRAIN W/O DYE: CPT | Mod: 26

## 2018-01-01 PROCEDURE — 86850 RBC ANTIBODY SCREEN: CPT

## 2018-01-01 PROCEDURE — 82330 ASSAY OF CALCIUM: CPT

## 2018-01-01 PROCEDURE — 71046 X-RAY EXAM CHEST 2 VIEWS: CPT | Mod: 26

## 2018-01-01 PROCEDURE — 84100 ASSAY OF PHOSPHORUS: CPT

## 2018-01-01 PROCEDURE — 99285 EMERGENCY DEPT VISIT HI MDM: CPT

## 2018-01-01 PROCEDURE — 82947 ASSAY GLUCOSE BLOOD QUANT: CPT

## 2018-01-01 PROCEDURE — 80048 BASIC METABOLIC PNL TOTAL CA: CPT

## 2018-01-01 PROCEDURE — 94003 VENT MGMT INPAT SUBQ DAY: CPT

## 2018-01-01 PROCEDURE — 93320 DOPPLER ECHO COMPLETE: CPT | Mod: 26

## 2018-01-01 PROCEDURE — 84484 ASSAY OF TROPONIN QUANT: CPT

## 2018-01-01 PROCEDURE — 81001 URINALYSIS AUTO W/SCOPE: CPT

## 2018-01-01 PROCEDURE — 93325 DOPPLER ECHO COLOR FLOW MAPG: CPT

## 2018-01-01 PROCEDURE — 71045 X-RAY EXAM CHEST 1 VIEW: CPT | Mod: 26,77

## 2018-01-01 PROCEDURE — 93306 TTE W/DOPPLER COMPLETE: CPT

## 2018-01-01 PROCEDURE — 80074 ACUTE HEPATITIS PANEL: CPT

## 2018-01-01 PROCEDURE — 87086 URINE CULTURE/COLONY COUNT: CPT

## 2018-01-01 PROCEDURE — 93312 ECHO TRANSESOPHAGEAL: CPT | Mod: 26

## 2018-01-01 PROCEDURE — 93970 EXTREMITY STUDY: CPT | Mod: 26

## 2018-01-01 PROCEDURE — 82550 ASSAY OF CK (CPK): CPT

## 2018-01-01 PROCEDURE — 82435 ASSAY OF BLOOD CHLORIDE: CPT

## 2018-01-01 PROCEDURE — 84295 ASSAY OF SERUM SODIUM: CPT

## 2018-01-01 PROCEDURE — 94770: CPT

## 2018-01-01 PROCEDURE — 92526 ORAL FUNCTION THERAPY: CPT

## 2018-01-01 PROCEDURE — 87040 BLOOD CULTURE FOR BACTERIA: CPT

## 2018-01-01 PROCEDURE — 85384 FIBRINOGEN ACTIVITY: CPT

## 2018-01-01 PROCEDURE — 92610 EVALUATE SWALLOWING FUNCTION: CPT

## 2018-01-01 PROCEDURE — 93320 DOPPLER ECHO COMPLETE: CPT

## 2018-01-01 PROCEDURE — 86901 BLOOD TYPING SEROLOGIC RH(D): CPT

## 2018-01-01 PROCEDURE — 85014 HEMATOCRIT: CPT

## 2018-01-01 PROCEDURE — P9016: CPT

## 2018-01-01 PROCEDURE — 94002 VENT MGMT INPAT INIT DAY: CPT

## 2018-01-01 PROCEDURE — 71275 CT ANGIOGRAPHY CHEST: CPT | Mod: 26

## 2018-01-01 PROCEDURE — 74176 CT ABD & PELVIS W/O CONTRAST: CPT

## 2018-01-01 PROCEDURE — 82553 CREATINE MB FRACTION: CPT

## 2018-01-01 PROCEDURE — 86900 BLOOD TYPING SEROLOGIC ABO: CPT

## 2018-01-01 PROCEDURE — 80053 COMPREHEN METABOLIC PANEL: CPT

## 2018-01-01 PROCEDURE — 97163 PT EVAL HIGH COMPLEX 45 MIN: CPT

## 2018-01-01 PROCEDURE — 83735 ASSAY OF MAGNESIUM: CPT

## 2018-01-01 PROCEDURE — 36430 TRANSFUSION BLD/BLD COMPNT: CPT

## 2018-01-01 PROCEDURE — 80307 DRUG TEST PRSMV CHEM ANLYZR: CPT

## 2018-01-01 RX ORDER — HEPARIN SODIUM 5000 [USP'U]/ML
6500 INJECTION INTRAVENOUS; SUBCUTANEOUS EVERY 6 HOURS
Qty: 0 | Refills: 0 | Status: DISCONTINUED | OUTPATIENT
Start: 2018-01-01 | End: 2018-01-01

## 2018-01-01 RX ORDER — HEPARIN SODIUM 5000 [USP'U]/ML
1300 INJECTION INTRAVENOUS; SUBCUTANEOUS
Qty: 25000 | Refills: 0 | Status: DISCONTINUED | OUTPATIENT
Start: 2018-01-01 | End: 2018-01-01

## 2018-01-01 RX ORDER — ESMOLOL HCL 100MG/10ML
50 VIAL (ML) INTRAVENOUS
Qty: 2500 | Refills: 0 | Status: DISCONTINUED | OUTPATIENT
Start: 2018-01-01 | End: 2018-01-01

## 2018-01-01 RX ORDER — THIAMINE MONONITRATE (VIT B1) 100 MG
200 TABLET ORAL
Qty: 0 | Refills: 0 | Status: DISCONTINUED | OUTPATIENT
Start: 2018-01-01 | End: 2018-01-01

## 2018-01-01 RX ORDER — AMIODARONE HYDROCHLORIDE 400 MG/1
150 TABLET ORAL ONCE
Qty: 0 | Refills: 0 | Status: COMPLETED | OUTPATIENT
Start: 2018-01-01 | End: 2018-01-01

## 2018-01-01 RX ORDER — DEXTROSE 50 % IN WATER 50 %
15 SYRINGE (ML) INTRAVENOUS ONCE
Qty: 0 | Refills: 0 | Status: COMPLETED | OUTPATIENT
Start: 2018-01-01 | End: 2018-01-01

## 2018-01-01 RX ORDER — ACETYLCYSTEINE 200 MG/ML
4 VIAL (ML) MISCELLANEOUS ONCE
Qty: 0 | Refills: 0 | Status: DISCONTINUED | OUTPATIENT
Start: 2018-01-01 | End: 2018-01-01

## 2018-01-01 RX ORDER — HEPARIN SODIUM 5000 [USP'U]/ML
700 INJECTION INTRAVENOUS; SUBCUTANEOUS
Qty: 25000 | Refills: 0 | Status: DISCONTINUED | OUTPATIENT
Start: 2018-01-01 | End: 2018-01-01

## 2018-01-01 RX ORDER — SODIUM CHLORIDE 9 MG/ML
1000 INJECTION, SOLUTION INTRAVENOUS
Qty: 0 | Refills: 0 | Status: DISCONTINUED | OUTPATIENT
Start: 2018-01-01 | End: 2018-01-01

## 2018-01-01 RX ORDER — DULOXETINE HYDROCHLORIDE 30 MG/1
60 CAPSULE, DELAYED RELEASE ORAL DAILY
Qty: 0 | Refills: 0 | Status: DISCONTINUED | OUTPATIENT
Start: 2018-01-01 | End: 2018-01-01

## 2018-01-01 RX ORDER — OXYBUTYNIN CHLORIDE 5 MG
1 TABLET ORAL
Qty: 0 | Refills: 0 | COMMUNITY

## 2018-01-01 RX ORDER — CALCIUM GLUCONATE 100 MG/ML
1 VIAL (ML) INTRAVENOUS ONCE
Qty: 0 | Refills: 0 | Status: COMPLETED | OUTPATIENT
Start: 2018-01-01 | End: 2018-01-01

## 2018-01-01 RX ORDER — MIDODRINE HYDROCHLORIDE 2.5 MG/1
5 TABLET ORAL THREE TIMES A DAY
Qty: 0 | Refills: 0 | Status: DISCONTINUED | OUTPATIENT
Start: 2018-01-01 | End: 2018-01-01

## 2018-01-01 RX ORDER — INSULIN HUMAN 100 [IU]/ML
8 INJECTION, SOLUTION SUBCUTANEOUS
Qty: 50 | Refills: 0 | Status: DISCONTINUED | OUTPATIENT
Start: 2018-01-01 | End: 2018-01-01

## 2018-01-01 RX ORDER — APIXABAN 2.5 MG/1
5 TABLET, FILM COATED ORAL
Qty: 0 | Refills: 0 | COMMUNITY

## 2018-01-01 RX ORDER — PANTOPRAZOLE SODIUM 20 MG/1
40 TABLET, DELAYED RELEASE ORAL DAILY
Qty: 0 | Refills: 0 | Status: DISCONTINUED | OUTPATIENT
Start: 2018-01-01 | End: 2018-01-01

## 2018-01-01 RX ORDER — CEFTRIAXONE 500 MG/1
1 INJECTION, POWDER, FOR SOLUTION INTRAMUSCULAR; INTRAVENOUS EVERY 24 HOURS
Qty: 0 | Refills: 0 | Status: COMPLETED | OUTPATIENT
Start: 2018-01-01 | End: 2018-01-01

## 2018-01-01 RX ORDER — METOPROLOL TARTRATE 50 MG
1 TABLET ORAL
Qty: 0 | Refills: 0 | COMMUNITY

## 2018-01-01 RX ORDER — PIPERACILLIN AND TAZOBACTAM 4; .5 G/20ML; G/20ML
3.38 INJECTION, POWDER, LYOPHILIZED, FOR SOLUTION INTRAVENOUS EVERY 8 HOURS
Qty: 0 | Refills: 0 | Status: DISCONTINUED | OUTPATIENT
Start: 2018-01-01 | End: 2018-01-01

## 2018-01-01 RX ORDER — ACETYLCYSTEINE 200 MG/ML
8 VIAL (ML) MISCELLANEOUS ONCE
Qty: 0 | Refills: 0 | Status: DISCONTINUED | OUTPATIENT
Start: 2018-01-01 | End: 2018-01-01

## 2018-01-01 RX ORDER — FENTANYL CITRATE 50 UG/ML
25 INJECTION INTRAVENOUS ONCE
Qty: 0 | Refills: 0 | Status: DISCONTINUED | OUTPATIENT
Start: 2018-01-01 | End: 2018-01-01

## 2018-01-01 RX ORDER — HYDROXYCHLOROQUINE SULFATE 200 MG
1 TABLET ORAL
Qty: 0 | Refills: 0 | COMMUNITY

## 2018-01-01 RX ORDER — LISINOPRIL 2.5 MG/1
1 TABLET ORAL
Qty: 0 | Refills: 0 | COMMUNITY

## 2018-01-01 RX ORDER — DOBUTAMINE HCL 250MG/20ML
3 VIAL (ML) INTRAVENOUS
Qty: 500 | Refills: 0 | Status: DISCONTINUED | OUTPATIENT
Start: 2018-01-01 | End: 2018-01-01

## 2018-01-01 RX ORDER — DEXTROSE 50 % IN WATER 50 %
25 SYRINGE (ML) INTRAVENOUS ONCE
Qty: 0 | Refills: 0 | Status: DISCONTINUED | OUTPATIENT
Start: 2018-01-01 | End: 2018-01-01

## 2018-01-01 RX ORDER — INSULIN LISPRO 100/ML
VIAL (ML) SUBCUTANEOUS EVERY 6 HOURS
Qty: 0 | Refills: 0 | Status: DISCONTINUED | OUTPATIENT
Start: 2018-01-01 | End: 2018-01-01

## 2018-01-01 RX ORDER — ASCORBIC ACID 60 MG
1500 TABLET,CHEWABLE ORAL
Qty: 0 | Refills: 0 | Status: DISCONTINUED | OUTPATIENT
Start: 2018-01-01 | End: 2018-01-01

## 2018-01-01 RX ORDER — SODIUM CHLORIDE 9 MG/ML
1000 INJECTION, SOLUTION INTRAVENOUS ONCE
Qty: 0 | Refills: 0 | Status: COMPLETED | OUTPATIENT
Start: 2018-01-01 | End: 2018-01-01

## 2018-01-01 RX ORDER — ACETYLCYSTEINE 200 MG/ML
4 VIAL (ML) MISCELLANEOUS ONCE
Qty: 0 | Refills: 0 | Status: COMPLETED | OUTPATIENT
Start: 2018-01-01 | End: 2018-01-01

## 2018-01-01 RX ORDER — MAGNESIUM SULFATE 500 MG/ML
2 VIAL (ML) INJECTION ONCE
Qty: 0 | Refills: 0 | Status: COMPLETED | OUTPATIENT
Start: 2018-01-01 | End: 2018-01-01

## 2018-01-01 RX ORDER — METHOTREXATE 2.5 MG/1
7 TABLET ORAL
Qty: 0 | Refills: 0 | COMMUNITY

## 2018-01-01 RX ORDER — METOPROLOL TARTRATE 50 MG
12.5 TABLET ORAL
Qty: 0 | Refills: 0 | Status: DISCONTINUED | OUTPATIENT
Start: 2018-01-01 | End: 2018-01-01

## 2018-01-01 RX ORDER — ACETYLCYSTEINE 200 MG/ML
12 VIAL (ML) MISCELLANEOUS EVERY 24 HOURS
Qty: 0 | Refills: 0 | Status: DISCONTINUED | OUTPATIENT
Start: 2018-01-01 | End: 2018-01-01

## 2018-01-01 RX ORDER — HALOPERIDOL DECANOATE 100 MG/ML
2 INJECTION INTRAMUSCULAR EVERY 6 HOURS
Qty: 0 | Refills: 0 | Status: DISCONTINUED | OUTPATIENT
Start: 2018-01-01 | End: 2018-01-01

## 2018-01-01 RX ORDER — HEPARIN SODIUM 5000 [USP'U]/ML
3000 INJECTION INTRAVENOUS; SUBCUTANEOUS EVERY 6 HOURS
Qty: 0 | Refills: 0 | Status: DISCONTINUED | OUTPATIENT
Start: 2018-01-01 | End: 2018-01-01

## 2018-01-01 RX ORDER — RANITIDINE HYDROCHLORIDE 150 MG/1
1 TABLET, FILM COATED ORAL
Qty: 0 | Refills: 0 | COMMUNITY

## 2018-01-01 RX ORDER — SODIUM BICARBONATE 1 MEQ/ML
0.18 SYRINGE (ML) INTRAVENOUS
Qty: 150 | Refills: 0 | Status: DISCONTINUED | OUTPATIENT
Start: 2018-01-01 | End: 2018-01-01

## 2018-01-01 RX ORDER — HEPARIN SODIUM 5000 [USP'U]/ML
1200 INJECTION INTRAVENOUS; SUBCUTANEOUS
Qty: 25000 | Refills: 0 | Status: DISCONTINUED | OUTPATIENT
Start: 2018-01-01 | End: 2018-01-01

## 2018-01-01 RX ORDER — VASOPRESSIN 20 [USP'U]/ML
0.04 INJECTION INTRAVENOUS
Qty: 100 | Refills: 0 | Status: DISCONTINUED | OUTPATIENT
Start: 2018-01-01 | End: 2018-01-01

## 2018-01-01 RX ORDER — HYDROCORTISONE 20 MG
50 TABLET ORAL EVERY 6 HOURS
Qty: 0 | Refills: 0 | Status: DISCONTINUED | OUTPATIENT
Start: 2018-01-01 | End: 2018-01-01

## 2018-01-01 RX ORDER — WARFARIN SODIUM 2.5 MG/1
5 TABLET ORAL DAILY
Qty: 0 | Refills: 0 | Status: DISCONTINUED | OUTPATIENT
Start: 2018-01-01 | End: 2018-01-01

## 2018-01-01 RX ORDER — AMIODARONE HYDROCHLORIDE 400 MG/1
150 TABLET ORAL ONCE
Qty: 0 | Refills: 0 | Status: DISCONTINUED | OUTPATIENT
Start: 2018-01-01 | End: 2018-01-01

## 2018-01-01 RX ORDER — ACETYLCYSTEINE 200 MG/ML
12 VIAL (ML) MISCELLANEOUS EVERY 24 HOURS
Qty: 0 | Refills: 0 | Status: COMPLETED | OUTPATIENT
Start: 2018-01-01 | End: 2018-01-01

## 2018-01-01 RX ORDER — AMIODARONE HYDROCHLORIDE 400 MG/1
400 TABLET ORAL EVERY 8 HOURS
Qty: 0 | Refills: 0 | Status: DISCONTINUED | OUTPATIENT
Start: 2018-01-01 | End: 2018-01-01

## 2018-01-01 RX ORDER — CALCIUM GLUCONATE 100 MG/ML
2 VIAL (ML) INTRAVENOUS ONCE
Qty: 0 | Refills: 0 | Status: COMPLETED | OUTPATIENT
Start: 2018-01-01 | End: 2018-01-01

## 2018-01-01 RX ORDER — FENTANYL CITRATE 50 UG/ML
1 INJECTION INTRAVENOUS
Qty: 2500 | Refills: 0 | Status: DISCONTINUED | OUTPATIENT
Start: 2018-01-01 | End: 2018-01-01

## 2018-01-01 RX ORDER — POTASSIUM CHLORIDE 20 MEQ
20 PACKET (EA) ORAL ONCE
Qty: 0 | Refills: 0 | Status: COMPLETED | OUTPATIENT
Start: 2018-01-01 | End: 2018-01-01

## 2018-01-01 RX ORDER — LOSARTAN/HYDROCHLOROTHIAZIDE 100MG-25MG
1 TABLET ORAL
Qty: 0 | Refills: 0 | COMMUNITY

## 2018-01-01 RX ORDER — MILRINONE LACTATE 1 MG/ML
0.3 INJECTION, SOLUTION INTRAVENOUS
Qty: 20 | Refills: 0 | Status: DISCONTINUED | OUTPATIENT
Start: 2018-01-01 | End: 2018-01-01

## 2018-01-01 RX ORDER — GLUCAGON INJECTION, SOLUTION 0.5 MG/.1ML
1 INJECTION, SOLUTION SUBCUTANEOUS ONCE
Qty: 0 | Refills: 0 | Status: DISCONTINUED | OUTPATIENT
Start: 2018-01-01 | End: 2018-01-01

## 2018-01-01 RX ORDER — POTASSIUM CHLORIDE 20 MEQ
20 PACKET (EA) ORAL
Qty: 0 | Refills: 0 | Status: DISCONTINUED | OUTPATIENT
Start: 2018-01-01 | End: 2018-01-01

## 2018-01-01 RX ORDER — HYDROMORPHONE HYDROCHLORIDE 2 MG/ML
0.5 INJECTION INTRAMUSCULAR; INTRAVENOUS; SUBCUTANEOUS EVERY 4 HOURS
Qty: 0 | Refills: 0 | Status: DISCONTINUED | OUTPATIENT
Start: 2018-01-01 | End: 2018-01-01

## 2018-01-01 RX ORDER — DEXTROSE 50 % IN WATER 50 %
15 SYRINGE (ML) INTRAVENOUS ONCE
Qty: 0 | Refills: 0 | Status: DISCONTINUED | OUTPATIENT
Start: 2018-01-01 | End: 2018-01-01

## 2018-01-01 RX ORDER — ACETYLCYSTEINE 200 MG/ML
12 VIAL (ML) MISCELLANEOUS ONCE
Qty: 0 | Refills: 0 | Status: COMPLETED | OUTPATIENT
Start: 2018-01-01 | End: 2018-01-01

## 2018-01-01 RX ORDER — DIGOXIN 250 MCG
0.5 TABLET ORAL ONCE
Qty: 0 | Refills: 0 | Status: COMPLETED | OUTPATIENT
Start: 2018-01-01 | End: 2018-01-01

## 2018-01-01 RX ORDER — CHLORHEXIDINE GLUCONATE 213 G/1000ML
15 SOLUTION TOPICAL
Qty: 0 | Refills: 0 | Status: DISCONTINUED | OUTPATIENT
Start: 2018-01-01 | End: 2018-01-01

## 2018-01-01 RX ORDER — POTASSIUM CHLORIDE 20 MEQ
20 PACKET (EA) ORAL
Qty: 0 | Refills: 0 | Status: COMPLETED | OUTPATIENT
Start: 2018-01-01 | End: 2018-01-01

## 2018-01-01 RX ORDER — MIDAZOLAM HYDROCHLORIDE 1 MG/ML
2 INJECTION, SOLUTION INTRAMUSCULAR; INTRAVENOUS ONCE
Qty: 0 | Refills: 0 | Status: DISCONTINUED | OUTPATIENT
Start: 2018-01-01 | End: 2018-01-01

## 2018-01-01 RX ORDER — HYDRALAZINE HCL 50 MG
5 TABLET ORAL ONCE
Qty: 0 | Refills: 0 | Status: COMPLETED | OUTPATIENT
Start: 2018-01-01 | End: 2018-01-01

## 2018-01-01 RX ORDER — DULOXETINE HYDROCHLORIDE 30 MG/1
1 CAPSULE, DELAYED RELEASE ORAL
Qty: 0 | Refills: 0 | COMMUNITY

## 2018-01-01 RX ORDER — DEXTROSE 50 % IN WATER 50 %
50 SYRINGE (ML) INTRAVENOUS ONCE
Qty: 0 | Refills: 0 | Status: COMPLETED | OUTPATIENT
Start: 2018-01-01 | End: 2018-01-01

## 2018-01-01 RX ORDER — PIPERACILLIN AND TAZOBACTAM 4; .5 G/20ML; G/20ML
3.38 INJECTION, POWDER, LYOPHILIZED, FOR SOLUTION INTRAVENOUS EVERY 12 HOURS
Qty: 0 | Refills: 0 | Status: DISCONTINUED | OUTPATIENT
Start: 2018-01-01 | End: 2018-01-01

## 2018-01-01 RX ORDER — HALOPERIDOL DECANOATE 100 MG/ML
2 INJECTION INTRAMUSCULAR ONCE
Qty: 0 | Refills: 0 | Status: COMPLETED | OUTPATIENT
Start: 2018-01-01 | End: 2018-01-01

## 2018-01-01 RX ORDER — FENTANYL CITRATE 50 UG/ML
50 INJECTION INTRAVENOUS ONCE
Qty: 0 | Refills: 0 | Status: DISCONTINUED | OUTPATIENT
Start: 2018-01-01 | End: 2018-01-01

## 2018-01-01 RX ORDER — SODIUM BICARBONATE 1 MEQ/ML
100 SYRINGE (ML) INTRAVENOUS ONCE
Qty: 0 | Refills: 0 | Status: COMPLETED | OUTPATIENT
Start: 2018-01-01 | End: 2018-01-01

## 2018-01-01 RX ORDER — CALCIUM GLUCONATE 100 MG/ML
2 VIAL (ML) INTRAVENOUS ONCE
Qty: 0 | Refills: 0 | Status: DISCONTINUED | OUTPATIENT
Start: 2018-01-01 | End: 2018-01-01

## 2018-01-01 RX ORDER — INSULIN HUMAN 100 [IU]/ML
8 INJECTION, SOLUTION SUBCUTANEOUS
Qty: 100 | Refills: 0 | Status: DISCONTINUED | OUTPATIENT
Start: 2018-01-01 | End: 2018-01-01

## 2018-01-01 RX ORDER — DEXMEDETOMIDINE HYDROCHLORIDE IN 0.9% SODIUM CHLORIDE 4 UG/ML
0.4 INJECTION INTRAVENOUS
Qty: 200 | Refills: 0 | Status: DISCONTINUED | OUTPATIENT
Start: 2018-01-01 | End: 2018-01-01

## 2018-01-01 RX ORDER — DEXMEDETOMIDINE HYDROCHLORIDE IN 0.9% SODIUM CHLORIDE 4 UG/ML
0.3 INJECTION INTRAVENOUS
Qty: 200 | Refills: 0 | Status: DISCONTINUED | OUTPATIENT
Start: 2018-01-01 | End: 2018-01-01

## 2018-01-01 RX ORDER — DEXTROSE 50 % IN WATER 50 %
12.5 SYRINGE (ML) INTRAVENOUS ONCE
Qty: 0 | Refills: 0 | Status: DISCONTINUED | OUTPATIENT
Start: 2018-01-01 | End: 2018-01-01

## 2018-01-01 RX ORDER — FENTANYL CITRATE 50 UG/ML
100 INJECTION INTRAVENOUS ONCE
Qty: 0 | Refills: 0 | Status: DISCONTINUED | OUTPATIENT
Start: 2018-01-01 | End: 2018-01-01

## 2018-01-01 RX ORDER — POTASSIUM CHLORIDE 20 MEQ
40 PACKET (EA) ORAL ONCE
Qty: 0 | Refills: 0 | Status: COMPLETED | OUTPATIENT
Start: 2018-01-01 | End: 2018-01-01

## 2018-01-01 RX ORDER — FENTANYL CITRATE 50 UG/ML
50 INJECTION INTRAVENOUS
Qty: 0 | Refills: 0 | Status: DISCONTINUED | OUTPATIENT
Start: 2018-01-01 | End: 2018-01-01

## 2018-01-01 RX ORDER — FUROSEMIDE 40 MG
80 TABLET ORAL ONCE
Qty: 0 | Refills: 0 | Status: COMPLETED | OUTPATIENT
Start: 2018-01-01 | End: 2018-01-01

## 2018-01-01 RX ORDER — ASCORBIC ACID 60 MG
1500 TABLET,CHEWABLE ORAL EVERY 6 HOURS
Qty: 0 | Refills: 0 | Status: DISCONTINUED | OUTPATIENT
Start: 2018-01-01 | End: 2018-01-01

## 2018-01-01 RX ORDER — ACETAMINOPHEN 500 MG
1000 TABLET ORAL ONCE
Qty: 0 | Refills: 0 | Status: DISCONTINUED | OUTPATIENT
Start: 2018-01-01 | End: 2018-01-01

## 2018-01-01 RX ORDER — NOREPINEPHRINE BITARTRATE/D5W 8 MG/250ML
0.04 PLASTIC BAG, INJECTION (ML) INTRAVENOUS
Qty: 8 | Refills: 0 | Status: DISCONTINUED | OUTPATIENT
Start: 2018-01-01 | End: 2018-01-01

## 2018-01-01 RX ORDER — INFLUENZA VIRUS VACCINE 15; 15; 15; 15 UG/.5ML; UG/.5ML; UG/.5ML; UG/.5ML
0.5 SUSPENSION INTRAMUSCULAR ONCE
Qty: 0 | Refills: 0 | Status: DISCONTINUED | OUTPATIENT
Start: 2018-01-01 | End: 2018-01-01

## 2018-01-01 RX ORDER — GENTAMICIN SULFATE 40 MG/ML
400 VIAL (ML) INJECTION ONCE
Qty: 0 | Refills: 0 | Status: COMPLETED | OUTPATIENT
Start: 2018-01-01 | End: 2018-01-01

## 2018-01-01 RX ADMIN — PIPERACILLIN AND TAZOBACTAM 25 GRAM(S): 4; .5 INJECTION, POWDER, LYOPHILIZED, FOR SOLUTION INTRAVENOUS at 05:40

## 2018-01-01 RX ADMIN — FENTANYL CITRATE 50 MICROGRAM(S): 50 INJECTION INTRAVENOUS at 17:11

## 2018-01-01 RX ADMIN — MILRINONE LACTATE 3.09 MICROGRAM(S)/KG/MIN: 1 INJECTION, SOLUTION INTRAVENOUS at 02:24

## 2018-01-01 RX ADMIN — PIPERACILLIN AND TAZOBACTAM 25 GRAM(S): 4; .5 INJECTION, POWDER, LYOPHILIZED, FOR SOLUTION INTRAVENOUS at 05:33

## 2018-01-01 RX ADMIN — MIDODRINE HYDROCHLORIDE 5 MILLIGRAM(S): 2.5 TABLET ORAL at 05:32

## 2018-01-01 RX ADMIN — CEFTRIAXONE 100 GRAM(S): 500 INJECTION, POWDER, FOR SOLUTION INTRAMUSCULAR; INTRAVENOUS at 13:52

## 2018-01-01 RX ADMIN — Medication 103 MILLIGRAM(S): at 18:40

## 2018-01-01 RX ADMIN — Medication 44.17 GRAM(S): at 00:05

## 2018-01-01 RX ADMIN — Medication 50 GRAM(S): at 10:57

## 2018-01-01 RX ADMIN — Medication 103 MILLIGRAM(S): at 11:12

## 2018-01-01 RX ADMIN — Medication 2 MILLIGRAM(S): at 23:17

## 2018-01-01 RX ADMIN — FENTANYL CITRATE 50 MICROGRAM(S): 50 INJECTION INTRAVENOUS at 21:00

## 2018-01-01 RX ADMIN — Medication 12.5 MILLIGRAM(S): at 05:21

## 2018-01-01 RX ADMIN — SODIUM CHLORIDE 70 MILLILITER(S): 9 INJECTION, SOLUTION INTRAVENOUS at 21:58

## 2018-01-01 RX ADMIN — FENTANYL CITRATE 100 MICROGRAM(S): 50 INJECTION INTRAVENOUS at 23:52

## 2018-01-01 RX ADMIN — HEPARIN SODIUM 400 UNIT(S)/HR: 5000 INJECTION INTRAVENOUS; SUBCUTANEOUS at 01:14

## 2018-01-01 RX ADMIN — HEPARIN SODIUM 500 UNIT(S)/HR: 5000 INJECTION INTRAVENOUS; SUBCUTANEOUS at 15:06

## 2018-01-01 RX ADMIN — Medication 50 GRAM(S): at 16:24

## 2018-01-01 RX ADMIN — Medication 20 MILLIEQUIVALENT(S): at 10:02

## 2018-01-01 RX ADMIN — CHLORHEXIDINE GLUCONATE 15 MILLILITER(S): 213 SOLUTION TOPICAL at 18:19

## 2018-01-01 RX ADMIN — PIPERACILLIN AND TAZOBACTAM 25 GRAM(S): 4; .5 INJECTION, POWDER, LYOPHILIZED, FOR SOLUTION INTRAVENOUS at 21:37

## 2018-01-01 RX ADMIN — FENTANYL CITRATE 25 MICROGRAM(S): 50 INJECTION INTRAVENOUS at 06:05

## 2018-01-01 RX ADMIN — HEPARIN SODIUM 300 UNIT(S)/HR: 5000 INJECTION INTRAVENOUS; SUBCUTANEOUS at 18:49

## 2018-01-01 RX ADMIN — FENTANYL CITRATE 50 MICROGRAM(S): 50 INJECTION INTRAVENOUS at 11:35

## 2018-01-01 RX ADMIN — Medication 100 MILLIEQUIVALENT(S): at 15:49

## 2018-01-01 RX ADMIN — FENTANYL CITRATE 100 MICROGRAM(S): 50 INJECTION INTRAVENOUS at 23:23

## 2018-01-01 RX ADMIN — HEPARIN SODIUM 100 UNIT(S)/HR: 5000 INJECTION INTRAVENOUS; SUBCUTANEOUS at 09:06

## 2018-01-01 RX ADMIN — Medication 102 MILLIGRAM(S): at 05:06

## 2018-01-01 RX ADMIN — Medication 44.17 GRAM(S): at 00:28

## 2018-01-01 RX ADMIN — Medication 50 MILLIGRAM(S): at 23:38

## 2018-01-01 RX ADMIN — FENTANYL CITRATE 25 MICROGRAM(S): 50 INJECTION INTRAVENOUS at 05:40

## 2018-01-01 RX ADMIN — Medication 103 MILLIGRAM(S): at 05:55

## 2018-01-01 RX ADMIN — Medication 6.19 MICROGRAM(S)/KG/MIN: at 23:31

## 2018-01-01 RX ADMIN — MIDAZOLAM HYDROCHLORIDE 2 MILLIGRAM(S): 1 INJECTION, SOLUTION INTRAMUSCULAR; INTRAVENOUS at 12:18

## 2018-01-01 RX ADMIN — Medication 200 GRAM(S): at 15:08

## 2018-01-01 RX ADMIN — PIPERACILLIN AND TAZOBACTAM 25 GRAM(S): 4; .5 INJECTION, POWDER, LYOPHILIZED, FOR SOLUTION INTRAVENOUS at 22:06

## 2018-01-01 RX ADMIN — HEPARIN SODIUM 1700 UNIT(S)/HR: 5000 INJECTION INTRAVENOUS; SUBCUTANEOUS at 23:31

## 2018-01-01 RX ADMIN — FENTANYL CITRATE 50 MICROGRAM(S): 50 INJECTION INTRAVENOUS at 11:37

## 2018-01-01 RX ADMIN — SODIUM CHLORIDE 70 MILLILITER(S): 9 INJECTION, SOLUTION INTRAVENOUS at 15:13

## 2018-01-01 RX ADMIN — Medication 7.42 MICROGRAM(S)/KG/MIN: at 23:17

## 2018-01-01 RX ADMIN — PIPERACILLIN AND TAZOBACTAM 25 GRAM(S): 4; .5 INJECTION, POWDER, LYOPHILIZED, FOR SOLUTION INTRAVENOUS at 18:42

## 2018-01-01 RX ADMIN — FENTANYL CITRATE 50 MICROGRAM(S): 50 INJECTION INTRAVENOUS at 11:52

## 2018-01-01 RX ADMIN — FENTANYL CITRATE 100 MICROGRAM(S): 50 INJECTION INTRAVENOUS at 22:29

## 2018-01-01 RX ADMIN — HEPARIN SODIUM 1400 UNIT(S)/HR: 5000 INJECTION INTRAVENOUS; SUBCUTANEOUS at 08:28

## 2018-01-01 RX ADMIN — FENTANYL CITRATE 25 MICROGRAM(S): 50 INJECTION INTRAVENOUS at 07:15

## 2018-01-01 RX ADMIN — Medication 103 MILLIGRAM(S): at 17:36

## 2018-01-01 RX ADMIN — Medication 5 MILLIGRAM(S): at 06:00

## 2018-01-01 RX ADMIN — Medication 100 MEQ/KG/HR: at 09:09

## 2018-01-01 RX ADMIN — HEPARIN SODIUM 1200 UNIT(S)/HR: 5000 INJECTION INTRAVENOUS; SUBCUTANEOUS at 19:05

## 2018-01-01 RX ADMIN — Medication 103 MILLIGRAM(S): at 17:55

## 2018-01-01 RX ADMIN — FENTANYL CITRATE 50 MICROGRAM(S): 50 INJECTION INTRAVENOUS at 11:10

## 2018-01-01 RX ADMIN — HEPARIN SODIUM 0 UNIT(S)/HR: 5000 INJECTION INTRAVENOUS; SUBCUTANEOUS at 00:14

## 2018-01-01 RX ADMIN — HYDROMORPHONE HYDROCHLORIDE 0.5 MILLIGRAM(S): 2 INJECTION INTRAMUSCULAR; INTRAVENOUS; SUBCUTANEOUS at 09:00

## 2018-01-01 RX ADMIN — PANTOPRAZOLE SODIUM 40 MILLIGRAM(S): 20 TABLET, DELAYED RELEASE ORAL at 11:27

## 2018-01-01 RX ADMIN — Medication 103 MILLIGRAM(S): at 05:33

## 2018-01-01 RX ADMIN — Medication 102 MILLIGRAM(S): at 17:35

## 2018-01-01 RX ADMIN — FENTANYL CITRATE 50 MICROGRAM(S): 50 INJECTION INTRAVENOUS at 01:23

## 2018-01-01 RX ADMIN — FENTANYL CITRATE 50 MICROGRAM(S): 50 INJECTION INTRAVENOUS at 19:52

## 2018-01-01 RX ADMIN — HEPARIN SODIUM 6500 UNIT(S): 5000 INJECTION INTRAVENOUS; SUBCUTANEOUS at 03:40

## 2018-01-01 RX ADMIN — Medication 100 MEQ/KG/HR: at 17:01

## 2018-01-01 RX ADMIN — Medication 103 MILLIGRAM(S): at 12:19

## 2018-01-01 RX ADMIN — Medication 103 MILLIGRAM(S): at 00:32

## 2018-01-01 RX ADMIN — HEPARIN SODIUM 300 UNIT(S)/HR: 5000 INJECTION INTRAVENOUS; SUBCUTANEOUS at 12:16

## 2018-01-01 RX ADMIN — Medication 103 MILLIGRAM(S): at 00:27

## 2018-01-01 RX ADMIN — PIPERACILLIN AND TAZOBACTAM 25 GRAM(S): 4; .5 INJECTION, POWDER, LYOPHILIZED, FOR SOLUTION INTRAVENOUS at 16:23

## 2018-01-01 RX ADMIN — FENTANYL CITRATE 25 MICROGRAM(S): 50 INJECTION INTRAVENOUS at 07:00

## 2018-01-01 RX ADMIN — CEFTRIAXONE 100 GRAM(S): 500 INJECTION, POWDER, FOR SOLUTION INTRAMUSCULAR; INTRAVENOUS at 14:05

## 2018-01-01 RX ADMIN — Medication 102 MILLIGRAM(S): at 18:39

## 2018-01-01 RX ADMIN — FENTANYL CITRATE 50 MICROGRAM(S): 50 INJECTION INTRAVENOUS at 11:24

## 2018-01-01 RX ADMIN — Medication 260 GRAM(S): at 18:12

## 2018-01-01 RX ADMIN — HEPARIN SODIUM 100 UNIT(S)/HR: 5000 INJECTION INTRAVENOUS; SUBCUTANEOUS at 15:53

## 2018-01-01 RX ADMIN — Medication 50 MILLIGRAM(S): at 00:27

## 2018-01-01 RX ADMIN — Medication 102 MILLIGRAM(S): at 17:47

## 2018-01-01 RX ADMIN — DEXMEDETOMIDINE HYDROCHLORIDE IN 0.9% SODIUM CHLORIDE 6.19 MICROGRAM(S)/KG/HR: 4 INJECTION INTRAVENOUS at 12:00

## 2018-01-01 RX ADMIN — FENTANYL CITRATE 50 MICROGRAM(S): 50 INJECTION INTRAVENOUS at 11:09

## 2018-01-01 RX ADMIN — HEPARIN SODIUM 0 UNIT(S)/HR: 5000 INJECTION INTRAVENOUS; SUBCUTANEOUS at 08:10

## 2018-01-01 RX ADMIN — SODIUM CHLORIDE 75 MILLILITER(S): 9 INJECTION, SOLUTION INTRAVENOUS at 14:45

## 2018-01-01 RX ADMIN — HEPARIN SODIUM 6500 UNIT(S): 5000 INJECTION INTRAVENOUS; SUBCUTANEOUS at 23:45

## 2018-01-01 RX ADMIN — Medication 4: at 18:22

## 2018-01-01 RX ADMIN — HEPARIN SODIUM 900 UNIT(S)/HR: 5000 INJECTION INTRAVENOUS; SUBCUTANEOUS at 03:38

## 2018-01-01 RX ADMIN — FENTANYL CITRATE 50 MICROGRAM(S): 50 INJECTION INTRAVENOUS at 23:00

## 2018-01-01 RX ADMIN — PANTOPRAZOLE SODIUM 40 MILLIGRAM(S): 20 TABLET, DELAYED RELEASE ORAL at 12:21

## 2018-01-01 RX ADMIN — Medication 6.19 MICROGRAM(S)/KG/MIN: at 14:46

## 2018-01-01 RX ADMIN — CHLORHEXIDINE GLUCONATE 15 MILLILITER(S): 213 SOLUTION TOPICAL at 05:06

## 2018-01-01 RX ADMIN — DEXMEDETOMIDINE HYDROCHLORIDE IN 0.9% SODIUM CHLORIDE 6.19 MICROGRAM(S)/KG/HR: 4 INJECTION INTRAVENOUS at 08:29

## 2018-01-01 RX ADMIN — HEPARIN SODIUM 1200 UNIT(S)/HR: 5000 INJECTION INTRAVENOUS; SUBCUTANEOUS at 16:45

## 2018-01-01 RX ADMIN — FENTANYL CITRATE 100 MICROGRAM(S): 50 INJECTION INTRAVENOUS at 23:04

## 2018-01-01 RX ADMIN — HYDROMORPHONE HYDROCHLORIDE 0.5 MILLIGRAM(S): 2 INJECTION INTRAMUSCULAR; INTRAVENOUS; SUBCUTANEOUS at 00:27

## 2018-01-01 RX ADMIN — FENTANYL CITRATE 50 MICROGRAM(S): 50 INJECTION INTRAVENOUS at 08:48

## 2018-01-01 RX ADMIN — Medication 6.19 MICROGRAM(S)/KG/MIN: at 03:58

## 2018-01-01 RX ADMIN — Medication 12.5 MILLIGRAM(S): at 06:09

## 2018-01-01 RX ADMIN — PIPERACILLIN AND TAZOBACTAM 25 GRAM(S): 4; .5 INJECTION, POWDER, LYOPHILIZED, FOR SOLUTION INTRAVENOUS at 23:38

## 2018-01-01 RX ADMIN — Medication 15 GRAM(S): at 04:45

## 2018-01-01 RX ADMIN — CEFTRIAXONE 100 GRAM(S): 500 INJECTION, POWDER, FOR SOLUTION INTRAMUSCULAR; INTRAVENOUS at 12:24

## 2018-01-01 RX ADMIN — Medication 50 MILLILITER(S): at 17:00

## 2018-01-01 RX ADMIN — Medication 103 MILLIGRAM(S): at 17:48

## 2018-01-01 RX ADMIN — Medication 6.19 MICROGRAM(S)/KG/MIN: at 21:37

## 2018-01-01 RX ADMIN — HEPARIN SODIUM 1300 UNIT(S)/HR: 5000 INJECTION INTRAVENOUS; SUBCUTANEOUS at 17:36

## 2018-01-01 RX ADMIN — Medication 50 MILLIGRAM(S): at 06:04

## 2018-01-01 RX ADMIN — Medication 50 MILLIEQUIVALENT(S): at 14:45

## 2018-01-01 RX ADMIN — Medication 24.75 MICROGRAM(S)/KG/MIN: at 21:58

## 2018-01-01 RX ADMIN — HEPARIN SODIUM 500 UNIT(S)/HR: 5000 INJECTION INTRAVENOUS; SUBCUTANEOUS at 06:50

## 2018-01-01 RX ADMIN — HEPARIN SODIUM 1300 UNIT(S)/HR: 5000 INJECTION INTRAVENOUS; SUBCUTANEOUS at 11:21

## 2018-01-01 RX ADMIN — Medication 103 MILLIGRAM(S): at 12:21

## 2018-01-01 RX ADMIN — HEPARIN SODIUM 0 UNIT(S)/HR: 5000 INJECTION INTRAVENOUS; SUBCUTANEOUS at 15:21

## 2018-01-01 RX ADMIN — HYDROMORPHONE HYDROCHLORIDE 0.5 MILLIGRAM(S): 2 INJECTION INTRAMUSCULAR; INTRAVENOUS; SUBCUTANEOUS at 08:33

## 2018-01-01 RX ADMIN — SODIUM CHLORIDE 70 MILLILITER(S): 9 INJECTION, SOLUTION INTRAVENOUS at 02:23

## 2018-01-01 RX ADMIN — CHLORHEXIDINE GLUCONATE 15 MILLILITER(S): 213 SOLUTION TOPICAL at 17:12

## 2018-01-01 RX ADMIN — INSULIN HUMAN 8 UNIT(S)/HR: 100 INJECTION, SOLUTION SUBCUTANEOUS at 21:34

## 2018-01-01 RX ADMIN — Medication 102 MILLIGRAM(S): at 17:30

## 2018-01-01 RX ADMIN — DULOXETINE HYDROCHLORIDE 60 MILLIGRAM(S): 30 CAPSULE, DELAYED RELEASE ORAL at 17:32

## 2018-01-01 RX ADMIN — Medication 200 GRAM(S): at 21:35

## 2018-01-01 RX ADMIN — Medication 24.75 MICROGRAM(S)/KG/MIN: at 14:22

## 2018-01-01 RX ADMIN — FENTANYL CITRATE 50 MICROGRAM(S): 50 INJECTION INTRAVENOUS at 17:30

## 2018-01-01 RX ADMIN — Medication 50 MILLIGRAM(S): at 23:54

## 2018-01-01 RX ADMIN — HYDROMORPHONE HYDROCHLORIDE 0.5 MILLIGRAM(S): 2 INJECTION INTRAMUSCULAR; INTRAVENOUS; SUBCUTANEOUS at 00:45

## 2018-01-01 RX ADMIN — Medication 103 MILLIGRAM(S): at 05:07

## 2018-01-01 RX ADMIN — MILRINONE LACTATE 7.42 MICROGRAM(S)/KG/MIN: 1 INJECTION, SOLUTION INTRAVENOUS at 15:40

## 2018-01-01 RX ADMIN — Medication 103 MILLIGRAM(S): at 23:54

## 2018-01-01 RX ADMIN — Medication 6.19 MICROGRAM(S)/KG/MIN: at 01:51

## 2018-01-01 RX ADMIN — HEPARIN SODIUM 1300 UNIT(S)/HR: 5000 INJECTION INTRAVENOUS; SUBCUTANEOUS at 15:06

## 2018-01-01 RX ADMIN — CHLORHEXIDINE GLUCONATE 15 MILLILITER(S): 213 SOLUTION TOPICAL at 05:56

## 2018-01-01 RX ADMIN — FENTANYL CITRATE 50 MICROGRAM(S): 50 INJECTION INTRAVENOUS at 20:45

## 2018-01-01 RX ADMIN — Medication 24.75 MICROGRAM(S)/KG/MIN: at 10:39

## 2018-01-01 RX ADMIN — Medication 24.75 MICROGRAM(S)/KG/MIN: at 01:24

## 2018-01-01 RX ADMIN — Medication 102 MILLIGRAM(S): at 05:39

## 2018-01-01 RX ADMIN — Medication 20 MILLIEQUIVALENT(S): at 18:33

## 2018-01-01 RX ADMIN — Medication 6.19 MICROGRAM(S)/KG/MIN: at 08:09

## 2018-01-01 RX ADMIN — Medication 50 MILLIGRAM(S): at 17:11

## 2018-01-01 RX ADMIN — HEPARIN SODIUM 700 UNIT(S)/HR: 5000 INJECTION INTRAVENOUS; SUBCUTANEOUS at 17:08

## 2018-01-01 RX ADMIN — PIPERACILLIN AND TAZOBACTAM 25 GRAM(S): 4; .5 INJECTION, POWDER, LYOPHILIZED, FOR SOLUTION INTRAVENOUS at 06:03

## 2018-01-01 RX ADMIN — Medication 12.5 MILLIGRAM(S): at 05:33

## 2018-01-01 RX ADMIN — Medication 50 MILLIGRAM(S): at 18:38

## 2018-01-01 RX ADMIN — HEPARIN SODIUM 3000 UNIT(S): 5000 INJECTION INTRAVENOUS; SUBCUTANEOUS at 21:17

## 2018-01-01 RX ADMIN — DEXMEDETOMIDINE HYDROCHLORIDE IN 0.9% SODIUM CHLORIDE 6.19 MICROGRAM(S)/KG/HR: 4 INJECTION INTRAVENOUS at 03:00

## 2018-01-01 RX ADMIN — Medication 50 MILLIGRAM(S): at 17:39

## 2018-01-01 RX ADMIN — Medication 130 GRAM(S): at 20:41

## 2018-01-01 RX ADMIN — Medication 50 MILLIEQUIVALENT(S): at 16:43

## 2018-01-01 RX ADMIN — Medication 102 MILLIGRAM(S): at 05:32

## 2018-01-01 RX ADMIN — HALOPERIDOL DECANOATE 2 MILLIGRAM(S): 100 INJECTION INTRAMUSCULAR at 09:00

## 2018-01-01 RX ADMIN — PANTOPRAZOLE SODIUM 40 MILLIGRAM(S): 20 TABLET, DELAYED RELEASE ORAL at 14:45

## 2018-01-01 RX ADMIN — PIPERACILLIN AND TAZOBACTAM 25 GRAM(S): 4; .5 INJECTION, POWDER, LYOPHILIZED, FOR SOLUTION INTRAVENOUS at 05:07

## 2018-01-01 RX ADMIN — HEPARIN SODIUM 6500 UNIT(S): 5000 INJECTION INTRAVENOUS; SUBCUTANEOUS at 11:26

## 2018-01-01 RX ADMIN — Medication 0.5 MILLIGRAM(S): at 03:48

## 2018-01-01 RX ADMIN — HEPARIN SODIUM 700 UNIT(S)/HR: 5000 INJECTION INTRAVENOUS; SUBCUTANEOUS at 21:16

## 2018-01-01 RX ADMIN — HEPARIN SODIUM 300 UNIT(S)/HR: 5000 INJECTION INTRAVENOUS; SUBCUTANEOUS at 00:45

## 2018-01-01 RX ADMIN — HYDROMORPHONE HYDROCHLORIDE 0.5 MILLIGRAM(S): 2 INJECTION INTRAMUSCULAR; INTRAVENOUS; SUBCUTANEOUS at 11:20

## 2018-01-01 RX ADMIN — Medication 12.5 MILLIGRAM(S): at 17:21

## 2018-01-01 RX ADMIN — PIPERACILLIN AND TAZOBACTAM 25 GRAM(S): 4; .5 INJECTION, POWDER, LYOPHILIZED, FOR SOLUTION INTRAVENOUS at 14:24

## 2018-01-01 RX ADMIN — FENTANYL CITRATE 50 MICROGRAM(S): 50 INJECTION INTRAVENOUS at 00:09

## 2018-01-01 RX ADMIN — Medication 80 MILLIGRAM(S): at 00:22

## 2018-01-01 RX ADMIN — Medication 50 MILLIGRAM(S): at 05:39

## 2018-01-01 RX ADMIN — WARFARIN SODIUM 5 MILLIGRAM(S): 2.5 TABLET ORAL at 23:34

## 2018-01-01 RX ADMIN — Medication 44.17 GRAM(S): at 23:40

## 2018-01-01 RX ADMIN — MIDODRINE HYDROCHLORIDE 5 MILLIGRAM(S): 2.5 TABLET ORAL at 21:57

## 2018-01-01 RX ADMIN — FENTANYL CITRATE 50 MICROGRAM(S): 50 INJECTION INTRAVENOUS at 20:03

## 2018-01-01 RX ADMIN — Medication 250 MILLIGRAM(S): at 01:51

## 2018-01-01 RX ADMIN — Medication 103 MILLIGRAM(S): at 05:39

## 2018-01-01 RX ADMIN — Medication 50 MILLIGRAM(S): at 00:10

## 2018-01-01 RX ADMIN — DEXMEDETOMIDINE HYDROCHLORIDE IN 0.9% SODIUM CHLORIDE 6.19 MICROGRAM(S)/KG/HR: 4 INJECTION INTRAVENOUS at 00:32

## 2018-01-01 RX ADMIN — Medication 50 MILLIGRAM(S): at 17:48

## 2018-01-01 RX ADMIN — FENTANYL CITRATE 50 MICROGRAM(S): 50 INJECTION INTRAVENOUS at 01:01

## 2018-01-01 RX ADMIN — AMIODARONE HYDROCHLORIDE 400 MILLIGRAM(S): 400 TABLET ORAL at 16:23

## 2018-01-01 RX ADMIN — HEPARIN SODIUM 0 UNIT(S)/HR: 5000 INJECTION INTRAVENOUS; SUBCUTANEOUS at 21:34

## 2018-01-01 RX ADMIN — Medication 50 MILLIGRAM(S): at 12:20

## 2018-01-01 RX ADMIN — HALOPERIDOL DECANOATE 2 MILLIGRAM(S): 100 INJECTION INTRAMUSCULAR at 06:14

## 2018-01-01 RX ADMIN — DULOXETINE HYDROCHLORIDE 60 MILLIGRAM(S): 30 CAPSULE, DELAYED RELEASE ORAL at 14:06

## 2018-01-01 RX ADMIN — PIPERACILLIN AND TAZOBACTAM 25 GRAM(S): 4; .5 INJECTION, POWDER, LYOPHILIZED, FOR SOLUTION INTRAVENOUS at 15:53

## 2018-01-01 RX ADMIN — MIDODRINE HYDROCHLORIDE 5 MILLIGRAM(S): 2.5 TABLET ORAL at 15:52

## 2018-01-01 RX ADMIN — AMIODARONE HYDROCHLORIDE 618 MILLIGRAM(S): 400 TABLET ORAL at 03:03

## 2018-01-01 RX ADMIN — Medication 12.5 MILLIGRAM(S): at 18:16

## 2018-01-01 RX ADMIN — Medication 102 MILLIGRAM(S): at 04:47

## 2018-01-01 RX ADMIN — SODIUM CHLORIDE 1000 MILLILITER(S): 9 INJECTION, SOLUTION INTRAVENOUS at 21:37

## 2018-01-01 RX ADMIN — HEPARIN SODIUM 0 UNIT(S)/HR: 5000 INJECTION INTRAVENOUS; SUBCUTANEOUS at 08:02

## 2018-01-01 RX ADMIN — FENTANYL CITRATE 8.25 MICROGRAM(S)/KG/HR: 50 INJECTION INTRAVENOUS at 00:26

## 2018-01-01 RX ADMIN — FENTANYL CITRATE 50 MICROGRAM(S): 50 INJECTION INTRAVENOUS at 22:06

## 2018-01-01 RX ADMIN — Medication 50 MILLIGRAM(S): at 11:24

## 2018-01-01 RX ADMIN — Medication 24.75 MICROGRAM(S)/KG/MIN: at 08:52

## 2018-01-01 RX ADMIN — HEPARIN SODIUM 0 UNIT(S)/HR: 5000 INJECTION INTRAVENOUS; SUBCUTANEOUS at 07:21

## 2018-01-01 RX ADMIN — Medication 12.5 MILLIGRAM(S): at 18:40

## 2018-01-01 RX ADMIN — HYDROMORPHONE HYDROCHLORIDE 0.5 MILLIGRAM(S): 2 INJECTION INTRAMUSCULAR; INTRAVENOUS; SUBCUTANEOUS at 11:35

## 2018-01-01 RX ADMIN — Medication 50 MILLIGRAM(S): at 05:34

## 2018-01-01 RX ADMIN — SODIUM CHLORIDE 70 MILLILITER(S): 9 INJECTION, SOLUTION INTRAVENOUS at 10:08

## 2018-01-01 RX ADMIN — Medication 50 MILLILITER(S): at 16:45

## 2018-01-01 RX ADMIN — Medication 6.19 MICROGRAM(S)/KG/MIN: at 10:15

## 2018-01-01 RX ADMIN — DULOXETINE HYDROCHLORIDE 60 MILLIGRAM(S): 30 CAPSULE, DELAYED RELEASE ORAL at 12:20

## 2018-01-01 RX ADMIN — Medication 103 MILLIGRAM(S): at 23:39

## 2018-01-01 RX ADMIN — CHLORHEXIDINE GLUCONATE 15 MILLILITER(S): 213 SOLUTION TOPICAL at 17:47

## 2018-01-01 RX ADMIN — Medication 50 MILLIGRAM(S): at 05:06

## 2018-01-01 RX ADMIN — DULOXETINE HYDROCHLORIDE 60 MILLIGRAM(S): 30 CAPSULE, DELAYED RELEASE ORAL at 12:25

## 2018-01-01 RX ADMIN — Medication 40 MILLIEQUIVALENT(S): at 09:38

## 2018-12-02 NOTE — H&P ADULT - PROBLEM SELECTOR PLAN 9
Likely related to hypoperfusion during shock state  Continue to monitor renal function and electrolytes  Replace electrolytes as needed  Monitor I&Os, daily weights  Renally dose meds  Avoid nephrotoxic agents Related to ATN  Continue sodium bicarb gtt  F/u repeat ABG and lactate

## 2018-12-02 NOTE — PROVIDER CONTACT NOTE (EICU) - BACKGROUND
Called by RN to who asked for order to change heparin to 1200 u/hr , this is the order based on her nomogram, heparin has been held for elevated PTT. Order placed . PTT to be checked at 8:30 PM per hospital protocol

## 2018-12-02 NOTE — H&P ADULT - PROBLEM SELECTOR PLAN 5
Likely traumatic related to fall with sustained rib fractures  [place] [facility]   Jenifer drain and AZAEL drain in place  ACS consulted to follow post-op course  Fentanyl PRN pain  Monitor strict I&Os Likely traumatic related to fall with sustained rib fractures  s/p splenectomy 12/2 at [facility]   Jenifer drain and AZAEL drain in place  ACS consulted to follow post-op course  Fentanyl PRN pain  Monitor strict I&Os  Case was d/w ACS Attending Dr. Lui

## 2018-12-02 NOTE — PROVIDER CONTACT NOTE (EICU) - BACKGROUND
called y RN for PTT > 200, INR 3.8. Gave verbal order to hold heparin, repeat stat PTT and fibrinogen to R/o DIC. Likekly will continue to hold heparin as INR is supratheraputic.

## 2018-12-02 NOTE — H&P ADULT - ASSESSMENT
76 yo female, PMHx non-Hodgkin's lymphoma, Sjogren's, HTN, GERD, anxiety, s/p LLL lobectomy, 76 yo female, PMHx non-Hodgkin's lymphoma, Sjogren's, HTN, GERD, anxiety, s/p LLL lobectomy, transferred to [facility] from [facility] with extensive R PE with cor pulmonale, shock, acute blood loss anemia secondary to splenic laceration with hemoperitoneum, UTI 78 yo female, PMHx non-Hodgkin's lymphoma, Sjogren's, HTN, GERD, anxiety, s/p LLL lobectomy, transferred to [facility] [place] [facility] [place] extensive R PE with cor pulmonale, shock, acute blood loss anemia secondary to splenic laceration with hemoperitoneum, UTI, ATN, transaminitis, coagulopathy    CRITICAL CARE TIME SPENT: 110 minutes assessing presenting problems of acute illness, which pose high probability of life threatening deterioration or end organ damage/dysfunction, as well as medical decision making including initiating plan of care, reviewing data, reviewing radiologic exams, discussing with multidisciplinary team, non-inclusive of procedures performed, discussing goals of care with patient's family.

## 2018-12-02 NOTE — H&P ADULT - PROBLEM SELECTOR PLAN 4
Related to splenic laceration, hemoperitoneum  Trend H/H q4h while on anticoagulation for PE  Transfuse as needed for Hgb < 8 or sooner for

## 2018-12-02 NOTE — H&P ADULT - PROBLEM SELECTOR PLAN 7
Related to traumatic splenic laceration with acute blood loss anemia, now s/p splenectomy  Monitor H/H q4h while on anticoagulation for PE Component of septic shock?   Asplenic  [place] [facility] [place]  Resend UCx and BCx here  Continue Zosyn for now  Trend leukocytosis and fever curve

## 2018-12-02 NOTE — H&P ADULT - PROBLEM SELECTOR PLAN 10
Related to ATN  Continue sodium bicarb gtt  F/u repeat ABG and lactate Likely shock liver in the setting of shock state vs passive hepatic congestion due to RV failure  Coagulopathic, thrombocytopenic  Monitor q4-6h INR, LFTs  Start on NAC   Steroids   Check ammonia level, acute hepatitis panel, r/o DIC  Hold Heparin gtt pending DIC w/u

## 2018-12-02 NOTE — PROVIDER CONTACT NOTE (EICU) - BACKGROUND
repeat coags noted asked by bedside team to reorder heparin drip at 1200 U /hr, their belief is that INR of 3.8 is in setting of ischemic hepatitis and patient is not fully anticoagulated for PE. at their request I re-ordered heparin gtt. repeat coags noted asked by bedside team to reorder heparin drip at 1200 U /hr, their belief is that INR of 3.8 is in setting of ischemic hepatitis and patient is not fully anticoagulated for PE due to INR alone . at their request I re-ordered heparin gtt.

## 2018-12-02 NOTE — H&P ADULT - PROBLEM SELECTOR PROBLEM 4
GENERAL FIELDS:   Critical Value      Onset: N/A      PHYSICIAN NEXT STEPS:   Call the Patient      DISPOSITION FIELDS:   Disposition Recommendation: Unspecified   Patient Directed To:    Disposition Override: N/A   Override Source: Unspecified   Consulted with PCP: No   Consulted with On-Call Physician: No      CONTACT INFO:   Name: ANJELICA SPAIN (Self)   Phone 1: [phone] (Home)      INTERVIEW STARTED:   09/16/17 05:04:04 AM   ASSIGNED TO/CLOSED BY:   Roselyn Lopez @ 09/16/17 05:15:50 AM         Acute blood loss anemia

## 2018-12-02 NOTE — H&P ADULT - PROBLEM SELECTOR PLAN 2
Likely multifactorial in the setting of cardiogenic shock, septic shock related to UTI in asplenic pt, hypovolemia related to acute blood loss anemia  Continue Vasopressin and Levophed, weaning off Levophed as tolerated  Continue IV fluid resuscitation as tolerated from cardiogenic standpoint  f/u formal TTE

## 2018-12-02 NOTE — CONSULT NOTE ADULT - SUBJECTIVE AND OBJECTIVE BOX
[place] [facility]                                                        [facility]                                                             Office: [address], [place] [address]                                                            Telephone: [phone]. Fax:[phone]              Patient is a 77y old  Female who presents with a chief complaint of Pulmonary Emboli with Cor Pulmonale, Splenic laceration s/p splenectomy, hemoperitoneum, acute blood loss anemia, shock (02 Dec 2018 15:00)      HPI:  76 yo female, PMHx non-Hodgkin's lymphoma, Sjogren's, HTN, GERD, anxiety, s/p LLL lobectomy, who was feeling short of breath mid-November, travelled to [place] by airplane just prior to , where she developed increasing shortness of breath. Patient presented to ED in [place] with complains of increasing shortness of breath, was admitted with acute PE and started on Eliquis. Reportedly fell during hospitalization details unclear. Patient was discharged os Eliquis , but developed increasing shortness of breath and was readmitted . Hospital w/up at [facility] confirmed splenic laceration grade II with yenny-splenic hematoma and blood collection. Patient underwent emergent splenectomy last night  intubated for airway protection currently in MICU. Patient being considered for possible percutaneous  interventional thrombectomy.     Unable to review ROS due to intubated state    NO report of : orthopnea PND,LE edema , syncope or pre-syncope  Functional status : unknown   As per records NO hx of Co-morbid: (-) DM, (_) CVA, (_) COPD (-) DVT (-) Bleeding d/o (-) MI (+) Hodgkin's lymphoma (+) Sjogren's syndrome   ECG: SRwith ns st twa  no phenotypic evidence of Brs, HCM ,LQTS              Allergies    Allergy Status Unknown    Intolerances        MEDICATIONS  (STANDING):  chlorhexidine 0.12% Liquid 15 milliLiter(s) Swish and Spit two times a day  dextrose 5%. 1000 milliLiter(s) (50 mL/Hr) IV Continuous <Continuous>  dextrose 50% Injectable 12.5 Gram(s) IV Push once  dextrose 50% Injectable 25 Gram(s) IV Push once  dextrose 50% Injectable 25 Gram(s) IV Push once  heparin  Infusion. 1300 Unit(s)/Hr (13 mL/Hr) IV Continuous <Continuous>  insulin lispro (HumaLOG) corrective regimen sliding scale   SubCutaneous every 6 hours  multiple electrolytes Injection Type 1 1000 milliLiter(s) (75 mL/Hr) IV Continuous <Continuous>  norepinephrine Infusion 0.04 MICROgram(s)/kG/Min (6.188 mL/Hr) IV Continuous <Continuous>  pantoprazole  Injectable 40 milliGRAM(s) IV Push daily  potassium chloride  20 mEq/100 mL IVPB 20 milliEquivalent(s) IV Intermittent every 2 hours  sodium bicarbonate  Injectable 100 milliEquivalent(s) IV Push once  vasopressin Infusion 0.04 Unit(s)/Min (2.4 mL/Hr) IV Continuous <Continuous>    MEDICATIONS  (PRN):  dextrose 40% Gel 15 Gram(s) Oral once PRN Blood Glucose LESS THAN 70 milliGRAM(s)/deciliter  fentaNYL    Injectable 50 MICROGram(s) IV Push every 2 hours PRN Moderate Pain (4 - 6)  glucagon  Injectable 1 milliGRAM(s) IntraMuscular once PRN Glucose LESS THAN 70 milligrams/deciliter  heparin  Injectable 6500 Unit(s) IV Push every 6 hours PRN For aPTT less than 40  heparin  Injectable 3000 Unit(s) IV Push every 6 hours PRN For aPTT between 40 - 57      FAMILY HISTORY:      SOCIAL HISTORY:Lives with family    CIGARETTES:None    ALCOHOL:None    REVIEW OF SYSTEMS: LTD due to intubated state.    Vital Signs Last 24 Hrs  T(C): 35.6 (02 Dec 2018 13:35), Max: 35.6 (02 Dec 2018 13:35)  T(F): 96.1 (02 Dec 2018 13:35), Max: 96.1 (02 Dec 2018 13:35)  HR: 127 (02 Dec 2018 15:00) (126 - 128)  BP: 164/106 (02 Dec 2018 14:00) (115/70 - 164/106)  BP(mean): 114 (02 Dec 2018 14:00) (88 - 114)  RR: 38 (02 Dec 2018 15:00) (30 - 38)  SpO2: 100% (02 Dec 2018 15:00) (99% - 100%)    Daily     Daily Weight in k.5 (02 Dec 2018 13:35)    I&O's Detail    02 Dec 2018 07:01  -  02 Dec 2018 15:29  --------------------------------------------------------  IN:    heparin  Infusion.: 13 mL    multiple electrolytes Injection Type 1: 500 mL    norepinephrine Infusion: 111 mL    vasopressin Infusion: 2.4 mL  Total IN: 626.4 mL    OUT:    Voided: 75 mL  Total OUT: 75 mL    Total NET: 551.4 mL          PHYSICAL EXAM:  Appearance: intubated and sedated	  HEENT:   Normal oral mucosa, PERRL, EOMI, sclera non-icteric	  Lymphatic: No cervical lymphadenopathy  Cardiovascular: Normal S1 S2, No JVD, No cardiac murmurs, No carotid bruits, No peripheral edema  Respiratory: Lungs clear to auscultation	  Psychiatry: A & O x 3, Mood & affect appropriate  Gastrointestinal:  Soft, Non-tender, + BS, no bruits	  Skin: No rashes, No ecchymoses, No cyanosis  Neurologic: Grossly non-focal with full strength in all four extremities  Extremities: Normal range of motion, No clubbing, cyanosis or edema  Vascular: Peripheral pulses palpable 2+ bilaterally      INTERPRETATION OF TELEMETRY:    ECG: S tach    LABS:                        10.5   8.7   )-----------( 146      ( 02 Dec 2018 14:09 )             31.9                 PT/INR - ( 02 Dec 2018 14:29 )   PT: 45.6 sec;   INR: 3.80 ratio         PTT - ( 02 Dec 2018 14:29 )  PTT:>200.0 sec    I&O's Summary    02 Dec 2018 07:01  -  02 Dec 2018 15:29  --------------------------------------------------------  IN: 626.4 mL / OUT: 75 mL / NET: 551.4 mL      BNP  RADIOLOGY & ADDITIONAL STUDIES:

## 2018-12-02 NOTE — H&P ADULT - HISTORY OF PRESENT ILLNESS
76 yo female, PMHx non-Hodgkin's lymphoma, Sjogren's, HTN, GERD, anxiety, s/p LLL lobectomy, who presented to [facility] feeling unwell with increasing abdominal pain. Patient was 76 yo female, PMHx non-Hodgkin's lymphoma, Sjogren's, HTN, GERD, anxiety, s/p LLL lobectomy, who was feeling short of breath mid-November, travelled to [place] by airplane just prior to Thanksgiving, where she developed increasing shortness of breath. Patient presented to ED in [place] with complains of increasing shortness of breath, was admitted with acute PE and started on Eliquis. Patient was discharged, but developed increasing shortness of breath and was readmitted 11/28. While in ED 78 yo female, PMHx non-Hodgkin's lymphoma, Sjogren's, HTN, GERD, anxiety, s/p LLL lobectomy, who was feeling short of breath mid-November, travelled to [place] by airplane just prior to Thanksgiving, where she developed increasing shortness of breath. Patient presented to ED in [place] with complains of increasing shortness of breath, was admitted with acute PE and started on Eliquis. Patient was discharged, but developed increasing shortness of breath and was readmitted 11/28. While in ED in [place], patient had fall from stretcher while sleeping, per family had chest Xray negative for rib fractures. Patient was d/c with Home O2 and Eliquis and flew back to [place], where she developed increasing abdominal pain. Patient was taken to [facility] [place] feeling unwell with worsening abdominal pain. CTA revealed extensive right main pulmonary embolus with clot burden extending through RUL, RML, RLL segmental branches with CT evidence of RV strain. Also revealed grade II splenic lacteration with adjacent hematoma and hemoperitoneum, and posterior L 8-10 mildly displaced rib fractures. Overnight patient developed acute blood loss anemia, shock, and was taken to OR for emergently splenectomy. Patient was transfused 4u PRBC in total and has EDUARDO and AZAEL drain in place. Patient was left intubated from OR. Post-op patient developed acute shock, H/H stable, and was started on Phenylephrine by surgical team. Phenylephrine was converted to Norepinephrine, and fixed-dose Vasopressin was added. Patient was also started on Bicarb gtt for metabolic acidosis. 78 yo female, PMHx non-Hodgkin's lymphoma, Sjogren's, HTN, GERD, anxiety, s/p LLL lobectomy, who was feeling short of breath mid-November, travelled to [place] by airplane just prior to Thanksgiving, where she developed increasing shortness of breath. Patient presented to ED in [place] with complains of increasing shortness of breath, was admitted with acute PE and started on Eliquis. Patient was discharged, but developed increasing shortness of breath and was readmitted 11/28. While in ED in [place], patient had fall from stretcher while sleeping, per family had chest Xray negative for rib fractures. Patient was d/c with Home O2 and Eliquis and flew back to [place], where she developed increasing abdominal pain. Patient was taken to [facility] 12/1 feeling unwell with worsening abdominal pain. CTA revealed extensive right main pulmonary embolus with clot burden extending through RUL, RML, RLL segmental branches with CT evidence of RV strain. Also revealed grade II splenic lacteration with adjacent hematoma and hemoperitoneum, and posterior L 8-10 mildly displaced rib fractures. Overnight patient developed acute blood loss anemia, shock, and was taken to OR for emergently splenectomy. Patient was transfused 4u PRBC in total and has EDUARDO and AZAEL drain in place. Patient was left intubated from OR. Post-op patient developed acute shock, H/H stable, and was started on Phenylephrine by surgical team. Phenylephrine was converted to Norepinephrine, and fixed-dose Vasopressin was added. Patient was also started on Bicarb gtt for metabolic acidosis. Patient developed AFib with RVR requiring Cardizem. Given extensive clot burden with cor pulmonale, and splenectomy precluding TPA lysis, patient was transferred to [facility] MICU for possible interventional thrombectomy. 76 yo female, PMHx non-Hodgkin's lymphoma, Sjogren's, HTN, GERD, anxiety, s/p LLL lobectomy, who was feeling short of breath mid-November, travelled to [place] by airplane just prior to Thanksgiving, where she developed increasing shortness of breath. Patient presented to ED in [place] with complains of increasing shortness of breath, was admitted with acute PE and started on Eliquis. Patient was discharged, but developed increasing shortness of breath and was readmitted 11/28. While in ED in [place], patient had fall from stretcher while sleeping, per family had chest Xray negative for rib fractures. Patient was d/c with Home O2 and Eliquis and flew back to [place], where she developed increasing abdominal pain. Patient was taken to [facility] [place]1 feeling unwell with worsening abdominal pain. CTA revealed extensive right main pulmonary embolus with clot burden extending through RUL, RML, RLL segmental branches with CT evidence of RV strain. Also revealed grade II splenic lacteration with adjacent hematoma and hemoperitoneum, and posterior L 8-10 mildly displaced rib fractures. Overnight patient developed acute blood loss anemia, shock, and was taken to OR for emergently splenectomy. Patient was transfused 4u PRBC in total and has EDUARDO and AZAEL drain in place. Patient was left intubated from OR. Post-op patient developed acute shock, H/H stable, and was started on Phenylephrine by surgical team. Phenylephrine was converted to Norepinephrine, and fixed-dose Vasopressin was added. Patient was also started on Bicarb gtt for metabolic acidosis. Patient developed AFib with RVR requiring Cardizem. Diagnosed with UTI started on Zosyn (cultures pending). Given extensive clot burden with cor pulmonale, and splenectomy precluding TPA lysis, patient was transferred to [facility] MICU for possible interventional thrombectomy. On arrival to [facility] MICU, patient off sedation able to answer questions. Levophed requirements downtrending with addition of Vasopressin. ATN, UOP slowly improving. Bedside POCUS with good LV contractility hypokinesis at apex, RV dilation. ABG with primary metabolic acidosis, elevated lactate 3.8. FiO2 40% SpO2 100%. 76 yo female, PMHx non-Hodgkin's lymphoma, Sjogren's, HTN, GERD, anxiety, s/p LLL lobectomy, who was feeling short of breath mid-November, travelled to [place] by airplane just prior to Thanksgiving, where she developed increasing shortness of breath. Patient presented to ED in [place] with complains of increasing shortness of breath, was admitted with acute PE and started on Eliquis. Patient was discharged, but developed increasing shortness of breath and was readmitted 11/28. While in ED in [place], patient had fall from stretcher while sleeping, per family had chest Xray negative for rib fractures. Patient was d/c with Home O2 and Eliquis and flew back to [place], where she developed increasing abdominal pain. Patient was taken to [facility] [place]1 feeling unwell with worsening abdominal pain. CTA revealed extensive right main pulmonary embolus with clot burden extending through RUL, RML, RLL segmental branches with CT evidence of RV strain. Also revealed grade II splenic lacteration with adjacent hematoma and hemoperitoneum, and posterior L 8-10 mildly displaced rib fractures. Overnight patient developed acute blood loss anemia, shock, and was taken to OR for emergently splenectomy. Patient was transfused 4u PRBC in total and has EDUARDO and AZAEL drain in place. Patient was left intubated from OR. Post-op patient developed acute shock, H/H stable, and was started on Phenylephrine by surgical team. Phenylephrine was converted to Norepinephrine, and fixed-dose Vasopressin was added. Patient was also started on Bicarb gtt for metabolic acidosis. Patient developed AFib with RVR requiring Cardizem. Diagnosed with UTI started on Zosyn (cultures pending). Given extensive clot burden with cor pulmonale, and splenectomy precluding TPA lysis, patient was transferred to [facility] MICU for possible interventional thrombectomy. On arrival to [facility] MICU, patient off sedation able to answer questions. Levophed requirements downtrending with addition of Vasopressin. ATN, UOP slowly improving. Bedside POCUS with good LV contractility hypokinesis at apex, RV dilation. ABG with primary metabolic acidosis, elevated lactate 3.8. FiO2 40% SpO2 100%. Started on sodium bicarb gtt, given 500cc IV fluid bolus and started on maintenance fluids, electrolytes replaced.

## 2018-12-02 NOTE — H&P ADULT - ATTENDING COMMENTS
76 yo female, PMHx non-Hodgkin's lymphoma, Sjogren's, HTN, GERD, anxiety, s/p LLL lobectomy, who was feeling short of breath mid-November, travelled to [place] by airplane just prior to Thanksgiving, diagnosed with PE and started on NOAc and subsequently had fall and ambulatory dysfunction. They optimized her O2 for travel back but a day or so after coming back [place] [facility] [place] and was found to have splenic rupture with PE and cor pulmonale and underwent splenectomy yesterday and was hypotensive earlier this morning.   Cardiology was consulted for that reason and she was accepted to [facility] MICU for evaluation for non-invasive thrombectomy.   She was transferred on levo and FD vasopressin and has remains intubated on MV.     1: Shock: Combination of obstrictive and component of distributive   2: Subacute PE with Cor pulmonale   3: Multiorgan failure: MELISSA, Acute hypoxic respiratory failure, Acute liver failure (elevated Nh4 and INR)  4: Acute UTI, R/O bacteremia   5: Splenic Laceration with hemoperitoneum s/p splenectomy   6: Acute blood loss anemia  7: Paroxysmal Afib during [place] [facility]    Patient seen and examined by me     PRN fentanyl for pain mx, Target RAAS o to -1 : would add precedex to achieve that as needed (wakes up and responds to few commands)    Remained on FD vasopressin but gradually increasing Levophed (0.6 at 1900), have been giving boluses of normosol with maintenance bicarb gtt, hyperdynamic LV with normal size and normal appearing function with dilated RV with decreased RVSF on POCUS, Added solucortef/ascorbic acid/ thiamine per protocol, plan d/w Dr. Murray and he agreed that patient is not candidate for CTsx guided thrombectomy/ECMO and he would be in touch with our ICU team through this evening trying to arrange for non-invasive thrombectomy (tonight vs tomorrow AM), trending lactate    On mechanical ventilation with volume AC but not requiring significant FiO2 and PEEP for maintaining Sats > 90%; Vent bundle with PLS    Remains on heparin gtt per protocol, neg w/u for DIC, but has thrombocytopenia from ?sepsis, H&H stable but would monitor closely for now and transfusion for Hb < 8     NPO, S/p Splenectomy, Called sx for post-op care, IV ppi, close monitoring of inr and ammonia level, started on lactulose and rifaximin can be added, also started on acetylcysteine for 72 hour protocol    On empiric pipe-tazo and will give once dose vanco while awaiting urine and blood cx, should follow cx from [facility] as well,  getting CT abd pelvis ruling out deeper source of infection    Close monitoring of Cr and electrolytes and correction as needed, avoiding nephrotoxic and hepatotoxic agents    HOlding RA meds    CCT: 90 min

## 2018-12-02 NOTE — CONSULT NOTE ADULT - REASON FOR ADMISSION
Pulmonary Emboli with Cor Pulmonale, Splenic laceration s/p splenectomy, hemoperitoneum, acute blood loss anemia, shock
Pulmonary Emboli with Cor Pulmonale, Splenic laceration s/p splenectomy, hemoperitoneum, acute blood loss anemia, shock

## 2018-12-02 NOTE — H&P ADULT - PROBLEM SELECTOR PLAN 8
Component of septic shock?   Asplenic  [place] culture [place] [facility] [place]  Resend UCx and BCx here  Continue Zosyn for now  Trend leukocytosis and fever curve Likely related to hypoperfusion during shock state  Continue to monitor renal function and electrolytes  Replace electrolytes as needed  Monitor I&Os, daily weights  Renally dose meds  Avoid nephrotoxic agents Likely related to hypoperfusion during shock state  Continue to monitor renal function and electrolytes  Replace electrolytes as needed  Indwelling hassan to monitor I&Os, daily weights  Renally dose meds  Avoid nephrotoxic agents

## 2018-12-02 NOTE — H&P ADULT - PROBLEM SELECTOR PLAN 1
Cardiology c/s for possible interventional thrombectomy  Not a candidate for TPA lysis given splenectomy in last 24h   check EKG, troponin, BNP  Bedside POCUS with good LV function, RV dilation. Will hold off on Inotropes for now especially in the setting of tachycardia  f/u full TTE  If patient developes worsening cardiogenic shock, worsening endpoints of perfusion, will reeval for addition of inotropic agent  Monitor oxygenation, on minimal FiO2 at this time, attempt to wean to extubate to improve hemodynamics Not a candidate for TPA lysis given splenectomy in last 24h   On systemic heparin for full anticoagulation, will aim to keep PTT threshold lower in the setting of recent hemoperitoneum   Cardiology eval for possible interventional thrombectomy, case was d/w Dr. Vann and Dr. Carney, will re-eval in AM  check EKG, troponin, BNP  Bedside POCUS with good LV function, RV dilation. Will hold off on Inotropes for now especially in the setting of tachycardia  f/u formal TTE  If patient developes worsening cardiogenic shock, worsening endpoints of perfusion, will reeval for addition of inotropic agent  Monitor oxygenation, on minimal FiO2 at this time, attempt to wean to extubate to improve hemodynamics

## 2018-12-02 NOTE — H&P ADULT - PROBLEM SELECTOR PLAN 3
Continue mechanical ventilation, low Tv 6cc/kg lung protective strategy  f/u repeat ABG and manipulate vent settings to augment acid-base balance

## 2018-12-02 NOTE — H&P ADULT - SKIN
detailed exam skin warm, distal extremities (hands and feet) cool to touch, bilateral LE mottling, turgor normal

## 2018-12-02 NOTE — H&P ADULT - NSHPATTENDINGPLANDISCUSS_GEN_ALL_CORE
3 daughters awaiting son's arrival and updated about patient's guarded prognosis and adviced on appointing one , who they have appointed to Karla (granddaughter) who works as US tech.  is 88 years old. Full code for now

## 2018-12-02 NOTE — PROVIDER CONTACT NOTE (EICU) - BACKGROUND
As per protocol. supplemented hypocalcemia, relative hypokalemia and hypomagnesemia  2 G calcium gluconate, 20 meq KCL x2 runs, 2 G Mag sulfate

## 2018-12-02 NOTE — CONSULT NOTE ADULT - SUBJECTIVE AND OBJECTIVE BOX
GENERAL SURGERY CONSULT    Consulting surgical team: [facility] - Acute Care Surgery   Consulting attending: Andrzej  Patient seen and examined: 18 @ 19:26        HPI:  INCOMPLETE CONSULT NOTE    PAST MEDICAL HISTORY:  Anxiety  Gastroesophageal reflux disease, esophagitis presence not specified  Hypertension  Sjogren's syndrome  Non-Hodgkin lymphoma      PAST SURGICAL HISTORY:  History of lobectomy of lung      ALLERGIES:  Allergy Status Unknown      MEDICATIONS:  acetylcysteine IVPB 4 Gram(s) IV Intermittent once  acetylcysteine IVPB 12 Gram(s) IV Intermittent every 24 hours  ascorbic acid IVPB 1500 milliGRAM(s) IV Intermittent every 6 hours  chlorhexidine 0.12% Liquid 15 milliLiter(s) Swish and Spit two times a day  dextrose 40% Gel 15 Gram(s) Oral once PRN  dextrose 5%. 1000 milliLiter(s) IV Continuous <Continuous>  dextrose 50% Injectable 12.5 Gram(s) IV Push once  dextrose 50% Injectable 25 Gram(s) IV Push once  dextrose 50% Injectable 25 Gram(s) IV Push once  fentaNYL    Injectable 50 MICROGram(s) IV Push every 2 hours PRN  glucagon  Injectable 1 milliGRAM(s) IntraMuscular once PRN  heparin  Infusion. 1200 Unit(s)/Hr IV Continuous <Continuous>  hydrocortisone sodium succinate Injectable 50 milliGRAM(s) IV Push every 6 hours  insulin lispro (HumaLOG) corrective regimen sliding scale   SubCutaneous every 6 hours  multiple electrolytes Injection Type 1 1000 milliLiter(s) IV Continuous <Continuous>  norepinephrine Infusion 0.04 MICROgram(s)/kG/Min IV Continuous <Continuous>  pantoprazole  Injectable 40 milliGRAM(s) IV Push daily  piperacillin/tazobactam IVPB. 3.375 Gram(s) IV Intermittent every 8 hours  sodium bicarbonate  Infusion 0.182 mEq/kG/Hr IV Continuous <Continuous>  thiamine IVPB 200 milliGRAM(s) IV Intermittent <User Schedule>  vasopressin Infusion 0.04 Unit(s)/Min IV Continuous <Continuous>      VITALS & I/Os:  Vital Signs Last 24 Hrs  T(C): 36.7 (02 Dec 2018 16:00), Max: 36.7 (02 Dec 2018 16:00)  T(F): 98.1 (02 Dec 2018 16:00), Max: 98.1 (02 Dec 2018 16:00)  HR: 124 (02 Dec 2018 18:00) (115 - 128)  BP: 147/87 (02 Dec 2018 18:00) (115/70 - 164/106)  BP(mean): 106 (02 Dec 2018 18:00) (88 - 114)  RR: 32 (02 Dec 2018 18:00) (24 - 38)  SpO2: 100% (02 Dec 2018 18:00) (99% - 100%)  Mode: AC/ CMV (Assist Control/ Continuous Mandatory Ventilation)  RR (machine): 24  TV (machine): 370  FiO2: 40  PEEP: 5  MAP: 10  PIP: 21    I&O's Summary    02 Dec 2018 07:01  -  02 Dec 2018 19:26  --------------------------------------------------------  IN: 2724.4 mL / OUT: 351 mL / NET: 2373.4 mL        PHYSICAL EXAM:  General: No acute distress  Respiratory: Nonlabored  Cardiovascular: RRR  Abdominal:   Extremities: Warm  Vascular:    LABS:                        10.5   8.7   )-----------( 146      ( 02 Dec 2018 14:09 )             31.9     12-02    142  |  106  |  31.0<H>  ----------------------------<  243<H>  3.8   |  22.0  |  1.59<H>    Ca    6.2<LL>      02 Dec 2018 15:09  Phos  4.4     12-02  Mg     1.3     12-02    TPro  3.0<L>  /  Alb  1.7<L>  /  TBili  2.3<H>  /  DBili  x   /  AST  3782<H>  /  ALT  1498<H>  /  AlkPhos  58  12-02    Lactate:    PT/INR - ( 02 Dec 2018 14:29 )   PT: 45.6 sec;   INR: 3.80 ratio         PTT - ( 02 Dec 2018 17:11 )  PTT:125.0 sec  ABG - ( 02 Dec 2018 17:19 )  pH, Arterial: 7.33  pH, Blood: x     /  pCO2: 38    /  pO2: 105   / HCO3: 20    / Base Excess: -5.3  /  SaO2: 98                CARDIAC MARKERS ( 02 Dec 2018 15:09 )  x     / 0.06 ng/mL / x     / x     / x          12- @ 14:15  A POS    Urinalysis Basic - ( 02 Dec 2018 17:43 )    Color: Shasta / Appearance: Slightly Turbid / S.010 / pH: x  Gluc: x / Ketone: Trace  / Bili: Small / Urobili: 4 mg/dL   Blood: x / Protein: 100 mg/dL / Nitrite: Negative   Leuk Esterase: Small / RBC: 25-50 /HPF / WBC 6-10   Sq Epi: x / Non Sq Epi: Few / Bacteria: Few          IMAGING:      ASSESSMENT:  77y Female    PLAN:  -STAT repeat CBC, BMP, Lactate, and ABG      Discussed with  GENERAL SURGERY CONSULT    Consulting surgical team: [facility] - Acute Care Surgery   Consulting attending: Andrzej  Patient seen and examined: 18 @ 19:26        HPI:  76yo female with recent hx significant for diagnosis of pulmonary embolism fall 3 days after presents s/p splenectomy from [facility]. Pt was diagnosed and treated in [place] for pulmonary embolism. While at hospital pt fell on left side; per family acute injury was reported at this time. Upon return to [place], pt began to experience sharp left upper abdominal pain with elevated heart rate. At [facility], CT imaging indicated extensive right sided pulmonary embolism as well as Grade 2 splenic laceration and left rib 8-10. She became hypotensive in ED and was given 2u PRBCs. Pt unfortunately did not respond appropriately and was taken to OR for emergent splenectomy. EBL was 250cc, Fluids administrated 550cc, UOP during case was 300cc. Case lasted approximately 3 hours. During the case she was placed on vasopressor support. [place] requested transfer for consideration of thrombectomy for PE due to cor pulomale.    PAST MEDICAL HISTORY:  Anxiety  Gastroesophageal reflux disease, esophagitis presence not specified  Hypertension  Sjogren's syndrome  Non-Hodgkin lymphoma      PAST SURGICAL HISTORY:  History of lobectomy of lung      ALLERGIES:  Allergy Status Unknown      MEDICATIONS:  acetylcysteine IVPB 4 Gram(s) IV Intermittent once  acetylcysteine IVPB 12 Gram(s) IV Intermittent every 24 hours  ascorbic acid IVPB 1500 milliGRAM(s) IV Intermittent every 6 hours  chlorhexidine 0.12% Liquid 15 milliLiter(s) Swish and Spit two times a day  dextrose 40% Gel 15 Gram(s) Oral once PRN  dextrose 5%. 1000 milliLiter(s) IV Continuous <Continuous>  dextrose 50% Injectable 12.5 Gram(s) IV Push once  dextrose 50% Injectable 25 Gram(s) IV Push once  dextrose 50% Injectable 25 Gram(s) IV Push once  fentaNYL    Injectable 50 MICROGram(s) IV Push every 2 hours PRN  glucagon  Injectable 1 milliGRAM(s) IntraMuscular once PRN  heparin  Infusion. 1200 Unit(s)/Hr IV Continuous <Continuous>  hydrocortisone sodium succinate Injectable 50 milliGRAM(s) IV Push every 6 hours  insulin lispro (HumaLOG) corrective regimen sliding scale   SubCutaneous every 6 hours  multiple electrolytes Injection Type 1 1000 milliLiter(s) IV Continuous <Continuous>  norepinephrine Infusion 0.04 MICROgram(s)/kG/Min IV Continuous <Continuous>  pantoprazole  Injectable 40 milliGRAM(s) IV Push daily  piperacillin/tazobactam IVPB. 3.375 Gram(s) IV Intermittent every 8 hours  sodium bicarbonate  Infusion 0.182 mEq/kG/Hr IV Continuous <Continuous>  thiamine IVPB 200 milliGRAM(s) IV Intermittent <User Schedule>  vasopressin Infusion 0.04 Unit(s)/Min IV Continuous <Continuous>      VITALS & I/Os:  Vital Signs Last 24 Hrs  T(C): 36.7 (02 Dec 2018 16:00), Max: 36.7 (02 Dec 2018 16:00)  T(F): 98.1 (02 Dec 2018 16:00), Max: 98.1 (02 Dec 2018 16:00)  HR: 124 (02 Dec 2018 18:00) (115 - 128)  BP: 147/87 (02 Dec 2018 18:00) (115/70 - 164/106)  BP(mean): 106 (02 Dec 2018 18:00) (88 - 114)  RR: 32 (02 Dec 2018 18:00) (24 - 38)  SpO2: 100% (02 Dec 2018 18:00) (99% - 100%)  Mode: AC/ CMV (Assist Control/ Continuous Mandatory Ventilation)  RR (machine): 24  TV (machine): 370  FiO2: 40  PEEP: 5  MAP: 10  PIP: 21    I&O's Summary    02 Dec 2018 07:01  -  02 Dec 2018 19:26  --------------------------------------------------------  IN: 2724.4 mL / OUT: 351 mL / NET: 2373.4 mL        PHYSICAL EXAM:  General: intubated  Respiratory: Ventilator Sounds  Cardiovascular: tachycardiac, S1/S2  Abdominal: soft, nondistended, incision clean and intact. EDUARDO dressing in place to midline.  AZAEL drain in place with sanguinous output  : Mccann in place  Skin: Cool    LABS:                        10.5   8.7   )-----------( 146      ( 02 Dec 2018 14:09 )             31.9     12-02    142  |  106  |  31.0<H>  ----------------------------<  243<H>  3.8   |  22.0  |  1.59<H>    Ca    6.2<LL>      02 Dec 2018 15:09  Phos  4.4       Mg     1.3         TPro  3.0<L>  /  Alb  1.7<L>  /  TBili  2.3<H>  /  DBili  x   /  AST  3782<H>  /  ALT  1498<H>  /  AlkPhos  58      Lactate:    PT/INR - ( 02 Dec 2018 14:29 )   PT: 45.6 sec;   INR: 3.80 ratio         PTT - ( 02 Dec 2018 17:11 )  PTT:125.0 sec  ABG - ( 02 Dec 2018 17:19 )  pH, Arterial: 7.33  pH, Blood: x     /  pCO2: 38    /  pO2: 105   / HCO3: 20    / Base Excess: -5.3  /  SaO2: 98                CARDIAC MARKERS ( 02 Dec 2018 15:09 )  x     / 0.06 ng/mL / x     / x     / x           @ 14:15  A POS    Urinalysis Basic - ( 02 Dec 2018 17:43 )    Color: Shasta / Appearance: Slightly Turbid / S.010 / pH: x  Gluc: x / Ketone: Trace  / Bili: Small / Urobili: 4 mg/dL   Blood: x / Protein: 100 mg/dL / Nitrite: Negative   Leuk Esterase: Small / RBC: 25-50 /HPF / WBC 6-10   Sq Epi: x / Non Sq Epi: Few / Bacteria: Few          IMAGING:      ASSESSMENT:  77y Female with respiratory failure, cor pulmonale, a fib, splenic laceration s/p splenectomy, Shock, MELISSA, and UTI    PLAN:  -STAT repeat CBC, BMP, Lactate, and ABG  -Recommend Cardiac Surgery for thrombectomy  -Continue heparin gtt  -Surgery to follow

## 2018-12-02 NOTE — H&P ADULT - PROBLEM SELECTOR PLAN 6
As above Related to traumatic splenic laceration with acute blood loss anemia, now s/p splenectomy  Monitor H/H q4h while on anticoagulation for PE

## 2018-12-02 NOTE — CONSULT NOTE ADULT - ASSESSMENT
78 yo female, PMHx non-Hodgkin's lymphoma, Sjogren's, HTN, GERD, anxiety, s/p LLL lobectomy, who was feeling short of breath mid-November, travelled to [place] by airplane just prior to Thanksgiving, where she developed increasing shortness of breath. Patient presented to ED in [place] with complains of increasing shortness of breath, was admitted with acute PE and started on Eliquis. Reportedly fell during hospitalization details unclear. Patient was discharged os Eliquis , but developed increasing shortness of breath and was readmitted 11/28. Hospital w/up at [facility] confirmed splenic laceration grade II with yenny-splenic hematoma and blood collection. Patient underwent emergent splenectomy last night  intubated for airway protection currently in MICU. Patient being considered for possible percutaneous  interventional thrombectomy.     Active issues:      1: Acute PE with significant RT PA clot burden filling defects in upper/mid/lower branches of RPA with RH strain  unable to receive systemic AC due fall c/b splenic rupture and bleed and multiple rib fx. Recc: CT Vasopressor support, add  to maintain MAP if hemodynamic instability occurs. f/up 2-D echo. remains intubated for airway protection  2: Splenic rupture s/p splenectomy tfuse prbc as indicated keep HCT > 27  3: AF with RVR at [facility] : resume IV dilt for recurrent AF , consider IV amio/ PO loading  for maintenance of SR given inability to systemically AC  4: UTI: Zosyn ;follow wbc/[facility] cx  Dr Murray/Dr Armenta  to follow-up care in am 76 yo female, PMHx non-Hodgkin's lymphoma, Sjogren's, HTN, GERD, anxiety, s/p LLL lobectomy, who was feeling short of breath mid-November, travelled to [place] by airplane just prior to Thanksgiving, where she developed increasing shortness of breath. Patient presented to ED in [place] with complains of increasing shortness of breath, was admitted with acute PE and started on Eliquis. Reportedly fell during hospitalization details unclear. Patient was discharged os Eliquis , but developed increasing shortness of breath and was readmitted 11/28. Hospital w/up at [facility] confirmed splenic laceration grade II with yenny-splenic hematoma and blood collection. Patient underwent emergent splenectomy last night  intubated for airway protection currently in MICU. Patient being considered for possible percutaneous  interventional thrombectomy.     Active issues:      1: Acute PE with significant RT PA clot burden filling defects in upper/mid/lower branches of RPA with RH strain UFH resumed post-op fall .  Recc: CT Vasopressor support, add  to maintain MAP if hemodynamic instability occurs. f/up 2-D echo remains intubated for airway protection  2: Splenic rupture s/p splenectomy tfuse prbc as indicated keep HCT > 27 follow H/H closely for other bleeding given traumtic fractures.  3: AF with RVR at [facility] : Emperic load with po amio 400mg po tid x 5 days then 200mg po daily; consider IV amio if breakthru occurs while PO loading. UFH infusion PTT 60-80  4: UTI: Zosyn ;follow wbc/[facility] cx  Dr Murray/Dr Armenta  to follow-up care in am

## 2018-12-02 NOTE — H&P ADULT - REASON FOR ADMISSION
Pulmonary Emboli with Cor Pulmonale, Splenic laceration s/p splenectomy, hemoperitoneum, acute blood loss anemia, shock

## 2018-12-03 NOTE — PROGRESS NOTE ADULT - SUBJECTIVE AND OBJECTIVE BOX
CARDIOLOGY PROGRESS NOTE   ([facility])                             Reason for follow up: Afib with RVR, submassive PE, possible obstructive shock                            Overnight: on multiple pressors, Milrinone and esmolol    Subjective: intubated, sedated, awake at times, trial of CPAP ongoing,   Review of symptoms: unable to obtain  Respiratory: intubated.  Gastrointestinal: No diarrhea. No abdominal pain. No bleeding.     Past medical history: No updates.     	  Vitals:  T(C): 37 (12-03-18 @ 11:28), Max: 37.4 (12-03-18 @ 01:00)  HR: 125 (12-03-18 @ 13:30) (109 - 139)  BP: 157/67 (12-03-18 @ 06:30) (95/59 - 157/67)  RR: 30 (12-03-18 @ 13:30) (19 - 38)  SpO2: 99% (12-03-18 @ 13:30) (95% - 100%)  Wt(kg): --  I&O's Summary    02 Dec 2018 07:01  -  03 Dec 2018 07:00  --------------------------------------------------------  IN: 8188.4 mL / OUT: 754 mL / NET: 7434.4 mL    03 Dec 2018 07:01  -  03 Dec 2018 14:57  --------------------------------------------------------  IN: 1640.4 mL / OUT: 110 mL / NET: 1530.4 mL      Weight (kg): 82.5 (12-02 @ 13:45)    PHYSICAL EXAM:  Appearance: diaphoretic , intubated  HEENT:  atraumatic,   Neck: TLC in right IJ,   Neurologic: A & O x 3, no focal deficits  Lymphatic: No cervical lymphadenopathy  Cardiovascular: Normal S1 S2, No murmur, rubs/gallops  Respiratory: Lungs clear to auscultation anteriorly  Gastrointestinal:  Soft, Non-tender,  no HSM  Lower Extremities: No edema, DP and PT +2  Psychiatry: intubated   Skin: No rashes, No ecchymoses, No cyanosis    CURRENT MEDICATIONS:  esMOLOL  Infusion 50 MICROgram(s)/kG/Min IV Continuous <Continuous>  milrinone Infusion 0.3 MICROgram(s)/kG/Min IV Continuous <Continuous>  norepinephrine Infusion 0.04 MICROgram(s)/kG/Min IV Continuous <Continuous>  piperacillin/tazobactam IVPB.  pantoprazole  Injectable  hydrocortisone sodium succinate Injectable  insulin Infusion  vasopressin Infusion  ascorbic acid IVPB  chlorhexidine 0.12% Liquid  dextrose 5%.  heparin  Infusion.  multiple electrolytes Injection Type 1  sodium bicarbonate  Infusion  thiamine IVPB      LABS:	 	                          8.4    17.1  )-----------( 157      ( 03 Dec 2018 06:18 )             25.6     12-03    140  |  97<L>  |  31.0<H>  ----------------------------<  154<H>  4.4   |  21.0<L>  |  1.87<H>    Ca    6.8<L>      03 Dec 2018 11:58  Phos  4.5     12-03  Mg     2.1     12-03    TPro  3.1<L>  /  Alb  1.7<L>  /  TBili  2.6<H>  /  DBili  x   /  AST  3570<H>  /  ALT  1885<H>  /  AlkPhos  68  12-03    proBNP:   Lipid Profile:   HgA1c: Hemoglobin A1C, Whole Blood: 5.3 %  TSH:     TELEMETRY: Afib with RVR	    ECG:  	Afib with RVR     DIAGNOSTIC TESTING:  [ ] Echocardiogram:  < from: TTE Echo Complete w/Doppler (12.02.18 @ 15:11) >  Summary:   1. Left ventricular ejection fraction, by visual estimation, is 55 to   60%.   2. Technically fair study.   3. The mitral in-flow pattern reveals no discernable A-wave, therefore   no comment on diastolic function can be made.   4. There is mild septal left ventricular hypertrophy.   5. Moderately reduced RV systolic function.   6. Thickening of the anterior and posterior mitral valve leaflets.   7. Mild-moderate tricuspid regurgitation.   8. Mild aortic regurgitation.   9. Sclerotic aortic valve with normal opening.  10. Pulmonic valve regurgitation.  11. Estimated pulmonary artery systolic pressure is 41.9 mmHg assuming a   right atrial pressure of 8 mmHg, which is consistent with mild pulmonary   hypertension.    < end of copied text >

## 2018-12-03 NOTE — PROGRESS NOTE ADULT - SUBJECTIVE AND OBJECTIVE BOX
Patient is a 77y old  Female who presents with a chief complaint of Pulmonary Emboli with Cor Pulmonale, Splenic laceration s/p splenectomy, hemoperitoneum, acute blood loss anemia, shock (03 Dec 2018 19:07)      BRIEF HOSPITAL COURSE: Admitted a month ago with PE to OSH -- anticoagulation, had a fall, subsequently found to have rib fx and splenic lac, s/p splenectomy at [place]; admitted to [facility] for consideration of advanced therapies for PE.    Events last 24 hours: Developed severe shock requiring pressors, started inotropes.  ischemic hepatitis, acute kidney injury, respiratory failure.  developed afrvr new onset.    PAST MEDICAL & SURGICAL HISTORY:  Anxiety  Gastroesophageal reflux disease, esophagitis presence not specified  Hypertension  Sjogren's syndrome  Non-Hodgkin lymphoma  History of lobectomy of lung: left lower lobe        Medications:  piperacillin/tazobactam IVPB. 3.375 Gram(s) IV Intermittent every 8 hours    esMOLOL  Infusion 50 MICROgram(s)/kG/Min IV Continuous <Continuous>  norepinephrine Infusion 0.04 MICROgram(s)/kG/Min IV Continuous <Continuous>      fentaNYL    Injectable 50 MICROGram(s) IV Push every 2 hours PRN      heparin  Infusion. 700 Unit(s)/Hr IV Continuous <Continuous>  heparin  Injectable 6500 Unit(s) IV Push every 6 hours PRN  heparin  Injectable 3000 Unit(s) IV Push every 6 hours PRN    pantoprazole  Injectable 40 milliGRAM(s) IV Push daily      dextrose 40% Gel 15 Gram(s) Oral once PRN  dextrose 50% Injectable 12.5 Gram(s) IV Push once  dextrose 50% Injectable 25 Gram(s) IV Push once  dextrose 50% Injectable 25 Gram(s) IV Push once  glucagon  Injectable 1 milliGRAM(s) IntraMuscular once PRN  hydrocortisone sodium succinate Injectable 50 milliGRAM(s) IV Push every 6 hours  insulin Infusion 8 Unit(s)/Hr IV Continuous <Continuous>  vasopressin Infusion 0.04 Unit(s)/Min IV Continuous <Continuous>    ascorbic acid IVPB 1500 milliGRAM(s) IV Intermittent every 6 hours  dextrose 5%. 1000 milliLiter(s) IV Continuous <Continuous>  multiple electrolytes Injection Type 1 1000 milliLiter(s) IV Continuous <Continuous>  thiamine IVPB 200 milliGRAM(s) IV Intermittent <User Schedule>    influenza   Vaccine 0.5 milliLiter(s) IntraMuscular once    chlorhexidine 0.12% Liquid 15 milliLiter(s) Swish and Spit two times a day    acetylcysteine IVPB 12 Gram(s) IV Intermittent every 24 hours      Mode: AC/ CMV (Assist Control/ Continuous Mandatory Ventilation)  RR (machine): 18  TV (machine): 340  FiO2: 30  PEEP: 5  MAP: 8  PIP: 13      ICU Vital Signs Last 24 Hrs  T(C): 38.7 (03 Dec 2018 15:44), Max: 38.7 (03 Dec 2018 15:44)  T(F): 101.7 (03 Dec 2018 15:44), Max: 101.7 (03 Dec 2018 15:44)  HR: 106 (03 Dec 2018 19:53) (106 - 139)  BP: 157/67 (03 Dec 2018 06:30) (157/67 - 157/67)  BP(mean): 96 (03 Dec 2018 06:30) (96 - 96)  ABP: 138/50 (03 Dec 2018 19:00) (82/47 - 164/69)  ABP(mean): 64 (03 Dec 2018 19:00) (47 - 97)  RR: 27 (03 Dec 2018 19:00) (24 - 34)  SpO2: 99% (03 Dec 2018 19:53) (95% - 100%)      ABG - ( 03 Dec 2018 05:51 )  pH, Arterial: 7.43  pH, Blood: x     /  pCO2: 31    /  pO2: 87    / HCO3: 22    / Base Excess: -3.0  /  SaO2: 98                  I&O's Detail    02 Dec 2018 07:01  -  03 Dec 2018 07:00  --------------------------------------------------------  IN:    fentaNYL  Infusion: 83 mL    heparin  Infusion.: 25 mL    heparin  Infusion.: 12 mL    insulin Infusion: 11.5 mL    milrinone  Infusion: 6.2 mL    milrinone  Infusion: 22.2 mL    multiple electrolytes Injection Type 1: 350 mL    multiple electrolytes Injection Type 1multiple electrolytes Injection Type 1: 2250 mL    norepinephrine Infusion: 1201.7 mL    sodium bicarbonate  Infusion: 1800 mL    Solution: 200 mL    Solution: 250 mL    Solution: 150 mL    Solution: 200 mL    Solution: 50 mL    Solution: 200 mL    Solution: 300 mL    Solution: 1033.6 mL    vasopressin Infusion: 43.2 mL  Total IN: 8188.4 mL    OUT:    Bulb: 100 mL    Nasoenteral Tube: 100 mL    Stool: 1 mL    Voided: 553 mL  Total OUT: 754 mL    Total NET: 7434.4 mL      03 Dec 2018 07:01  -  03 Dec 2018 20:25  --------------------------------------------------------  IN:    esmolol Infusion: 693.5 mL    heparin  Infusion.: 70 mL    heparin  Infusion.: 21 mL    insulin Infusion: 7.5 mL    milrinone  Infusion: 66.6 mL    multiple electrolytes Injection Type 1: 840 mL    norepinephrine Infusion: 564.5 mL    sodium bicarbonate  Infusion: 900 mL    Solution: 150 mL    Solution: 50 mL    Solution: 166.8 mL    Solution: 100 mL    Solution: 100 mL    vasopressin Infusion: 28.8 mL  Total IN: 3758.7 mL    OUT:    Bulb: 40 mL    Nasoenteral Tube: 50 mL    Voided: 355 mL  Total OUT: 445 mL    Total NET: 3313.7 mL            LABS:                        7.8    20.6  )-----------( 152      ( 03 Dec 2018 17:01 )             22.9     12    140  |  97<L>  |  31.0<H>  ----------------------------<  154<H>  4.4   |  21.0<L>  |  1.87<H>    Ca    6.8<L>      03 Dec 2018 11:58  Phos  4.5       Mg     2.1         TPro  3.1<L>  /  Alb  1.7<L>  /  TBili  2.6<H>  /  DBili  x   /  AST  3570<H>  /  ALT  1885<H>  /  AlkPhos  68  1203      CARDIAC MARKERS ( 03 Dec 2018 17:01 )  x     / x     / 810 U/L / x     / 12.8 ng/mL  CARDIAC MARKERS ( 02 Dec 2018 15:09 )  x     / 0.06 ng/mL / x     / x     / x          CAPILLARY BLOOD GLUCOSE  91 (03 Dec 2018 07:00)      POCT Blood Glucose.: 148 mg/dL (03 Dec 2018 20:01)    PT/INR - ( 03 Dec 2018 08:46 )   PT: 48.2 sec;   INR: 4.01 ratio         PTT - ( 03 Dec 2018 14:31 )  PTT:>200.0 sec  Urinalysis Basic - ( 02 Dec 2018 17:43 )    Color: Shasta / Appearance: Slightly Turbid / S.010 / pH: x  Gluc: x / Ketone: Trace  / Bili: Small / Urobili: 4 mg/dL   Blood: x / Protein: 100 mg/dL / Nitrite: Negative   Leuk Esterase: Small / RBC: 25-50 /HPF / WBC 6-10   Sq Epi: x / Non Sq Epi: Few / Bacteria: Few      CULTURES:      Physical Examination:    General: No acute distress.  Opens eyes to voice, doesn't follow commands    HEENT: Pupils equal, reactive to light.  Symmetric.    PULM: Clear to auscultation bilaterally, no significant sputum production    NECK: Supple, no lymphadenopathy, trachea midline    CVS: irreg irreg    ABD: Soft, nondistended, nontender, normoactive bowel sounds, no masses; AZAEL with minimal bloody drainage    EXT: No edema, nontender    SKIN: Warm and well perfused, no rashes noted.    NEURO: somnulent but opens eyes to voice.    DEVICES: L radial arterial catheter, RIJ CVC ([place]) needed for shock.    CRITICAL CARE TIME SPENT: 80 min

## 2018-12-03 NOTE — PROGRESS NOTE ADULT - SUBJECTIVE AND OBJECTIVE BOX
CC x34          77F NHL in  remission recent PE on NOAC had fall last week in [place],  presented to [facility] with abd pain shock splenic lac with hematoma s/p splenectomy,      Sent to [facility] for consideration of EKOS as she was found to have RV strain on echo in the setting of what was presumed obstructive shock, with R PA PE.          She has a distributive component to her shock too.    Her PCT is elevated and she had a + U/A at [facility].  Recent hx of urosepsis with shock  gent x1 added to zosyn vanco was given  as d/w Dr Reyes.  Has been placed on the  MARIk protocol.    She had a CT with IV contrast at  with no report of perinephric absess/hydro/pyelo    RV is not blown on official echo, and her R PA samantha burden is not tremendous.  Therre is only mild pulm HTN.      Here she is pressor dependent with increasing LFT's in the thousands presumed shock liver.  She was on significantly higher doses of pressor yesterday.  Mucomyst added.     Have been able to titrate levophed down a bit her UOP is OK, but her lactate is not clearing though my be partially still elevated due to ischemic hepatitis.  No significant response to volume loading.  IVC > 2cm without resp variation       Case d/w Dr Murray earlier tried dobutamine which put her into a fib with RVR.    Changing to milrinone instead.     D/W SX no ongoing active sx issues.

## 2018-12-03 NOTE — PROGRESS NOTE ADULT - SUBJECTIVE AND OBJECTIVE BOX
INTERVAL HPI/OVERNIGHT EVENTS: 78yo female with recent hx significant for diagnosis of pulmonary embolism fall 3 days after presents s/p splenectomy from [facility]. Pt was diagnosed and treated in [place] for pulmonary embolism. While at hospital pt fell on left side; per family acute injury was reported at this time. Upon return to [place], pt began to experience sharp left upper abdominal pain with elevated heart rate. At [facility], CT imaging indicated extensive right sided pulmonary embolism as well as Grade 2 splenic laceration and left rib 8-10. She became hypotensive in ED and was given 2u PRBCs. Pt unfortunately did not respond appropriately and was taken to OR for emergent splenectomy. EBL was 250cc, Fluids administrated 550cc, UOP during case was 300cc. Case lasted approximately 3 hours. During the case she was placed on vasopressor support. [place] requested transfer for consideration of thrombectomy for PE due to cor pulomale.    SUBJECTIVE: Pressor requirements coming down. Patient responding to voice, and more alert per patient's family. On NE and Vaso.        MEDICATIONS  (STANDING):  acetylcysteine IVPB 12 Gram(s) IV Intermittent every 24 hours  ascorbic acid IVPB 1500 milliGRAM(s) IV Intermittent every 6 hours  chlorhexidine 0.12% Liquid 15 milliLiter(s) Swish and Spit two times a day  dextrose 5%. 1000 milliLiter(s) (50 mL/Hr) IV Continuous <Continuous>  dextrose 50% Injectable 12.5 Gram(s) IV Push once  dextrose 50% Injectable 25 Gram(s) IV Push once  dextrose 50% Injectable 25 Gram(s) IV Push once  esMOLOL  Infusion 50 MICROgram(s)/kG/Min (24.75 mL/Hr) IV Continuous <Continuous>  heparin  Infusion. 700 Unit(s)/Hr (7 mL/Hr) IV Continuous <Continuous>  hydrocortisone sodium succinate Injectable 50 milliGRAM(s) IV Push every 6 hours  influenza   Vaccine 0.5 milliLiter(s) IntraMuscular once  insulin Infusion 8 Unit(s)/Hr (8 mL/Hr) IV Continuous <Continuous>  multiple electrolytes Injection Type 1 1000 milliLiter(s) (70 mL/Hr) IV Continuous <Continuous>  norepinephrine Infusion 0.04 MICROgram(s)/kG/Min (6.188 mL/Hr) IV Continuous <Continuous>  pantoprazole  Injectable 40 milliGRAM(s) IV Push daily  piperacillin/tazobactam IVPB. 3.375 Gram(s) IV Intermittent every 8 hours  thiamine IVPB 200 milliGRAM(s) IV Intermittent <User Schedule>  vasopressin Infusion 0.04 Unit(s)/Min (2.4 mL/Hr) IV Continuous <Continuous>    MEDICATIONS  (PRN):  dextrose 40% Gel 15 Gram(s) Oral once PRN Blood Glucose LESS THAN 70 milliGRAM(s)/deciliter  fentaNYL    Injectable 50 MICROGram(s) IV Push every 2 hours PRN Moderate Pain (4 - 6)  glucagon  Injectable 1 milliGRAM(s) IntraMuscular once PRN Glucose LESS THAN 70 milligrams/deciliter  heparin  Injectable 6500 Unit(s) IV Push every 6 hours PRN For aPTT less than 40  heparin  Injectable 3000 Unit(s) IV Push every 6 hours PRN For aPTT between 40 - 57      Vital Signs Last 24 Hrs  T(C): 38.7 (03 Dec 2018 15:44), Max: 38.7 (03 Dec 2018 15:44)  T(F): 101.7 (03 Dec 2018 15:44), Max: 101.7 (03 Dec 2018 15:44)  HR: 117 (03 Dec 2018 19:00) (106 - 139)  BP: 157/67 (03 Dec 2018 06:30) (95/59 - 157/67)  BP(mean): 96 (03 Dec 2018 06:30) (71 - 96)  RR: 27 (03 Dec 2018 19:00) (19 - 34)  SpO2: 99% (03 Dec 2018 19:00) (95% - 100%)    PHYSICAL EXAM:  General: intubated  Respiratory: Ventilator Sounds  Cardiovascular: tachycardiac, S1/S2  Abdominal: soft, nondistended, incision clean and intact. EDUARDO dressing in place to midline.  AZAEL drain in place with serosanguinous output  : Mccann in place w/mild hematuria, rectal tube in place  Skin: Cool      I&O's Detail    02 Dec 2018 07:01  -  03 Dec 2018 07:00  --------------------------------------------------------  IN:    fentaNYL  Infusion: 83 mL    heparin  Infusion.: 25 mL    heparin  Infusion.: 12 mL    insulin Infusion: 11.5 mL    milrinone  Infusion: 6.2 mL    milrinone  Infusion: 22.2 mL    multiple electrolytes Injection Type 1: 350 mL    multiple electrolytes Injection Type 1multiple electrolytes Injection Type 1: 2250 mL    norepinephrine Infusion: 1201.7 mL    sodium bicarbonate  Infusion: 1800 mL    Solution: 200 mL    Solution: 250 mL    Solution: 150 mL    Solution: 200 mL    Solution: 50 mL    Solution: 200 mL    Solution: 300 mL    Solution: 1033.6 mL    vasopressin Infusion: 43.2 mL  Total IN: 8188.4 mL    OUT:    Bulb: 100 mL    Nasoenteral Tube: 100 mL    Stool: 1 mL    Voided: 553 mL  Total OUT: 754 mL    Total NET: 7434.4 mL      03 Dec 2018 07:  -  03 Dec 2018 19:07  --------------------------------------------------------  IN:    esmolol Infusion: 693.5 mL    heparin  Infusion.: 70 mL    heparin  Infusion.: 21 mL    insulin Infusion: 7.5 mL    milrinone  Infusion: 66.6 mL    multiple electrolytes Injection Type 1: 840 mL    norepinephrine Infusion: 564.5 mL    sodium bicarbonate  Infusion: 900 mL    Solution: 150 mL    Solution: 50 mL    Solution: 166.8 mL    Solution: 100 mL    Solution: 100 mL    vasopressin Infusion: 28.8 mL  Total IN: 3758.7 mL    OUT:    Bulb: 40 mL    Voided: 355 mL  Total OUT: 395 mL    Total NET: 3363.7 mL          LABS:                        7.8    20.6  )-----------( 152      ( 03 Dec 2018 17:01 )             22.9     12-03    140  |  97<L>  |  31.0<H>  ----------------------------<  154<H>  4.4   |  21.0<L>  |  1.87<H>    Ca    6.8<L>      03 Dec 2018 11:58  Phos  4.5     12-  Mg     2.1     12-    TPro  3.1<L>  /  Alb  1.7<L>  /  TBili  2.6<H>  /  DBili  x   /  AST  3570<H>  /  ALT  1885<H>  /  AlkPhos  68  12-03    PT/INR - ( 03 Dec 2018 08:46 )   PT: 48.2 sec;   INR: 4.01 ratio         PTT - ( 03 Dec 2018 14:31 )  PTT:>200.0 sec  Urinalysis Basic - ( 02 Dec 2018 17:43 )    Color: Shasta / Appearance: Slightly Turbid / S.010 / pH: x  Gluc: x / Ketone: Trace  / Bili: Small / Urobili: 4 mg/dL   Blood: x / Protein: 100 mg/dL / Nitrite: Negative   Leuk Esterase: Small / RBC: 25-50 /HPF / WBC 6-10   Sq Epi: x / Non Sq Epi: Few / Bacteria: Few        RADIOLOGY & ADDITIONAL STUDIES:

## 2018-12-04 NOTE — DIETITIAN INITIAL EVALUATION ADULT. - SIGNS/SYMPTOMS
as evidenced by NPO status as evidenced by s/p splenectomy with EDUARDO and AZAEL drain in place, UTI, and septic shock

## 2018-12-04 NOTE — DIETITIAN INITIAL EVALUATION ADULT. - PERTINENT LABORATORY DATA
12-04 Na137 mmol/L Glu 80 mg/dL K+ 4.4 mmol/L Cr  2.10 mg/dL<H> BUN 37.0 mg/dL<H> Phos 5.1 mg/dL<H> Alb 1.4 g/dL<L> PAB n/a

## 2018-12-04 NOTE — DIETITIAN INITIAL EVALUATION ADULT. - ETIOLOGY
related to inability to consume sufficient protein-energy associated with intubation related to increased physiological demand for nutrient

## 2018-12-04 NOTE — DIETITIAN INITIAL EVALUATION ADULT. - PROBLEM SELECTOR PLAN 5
Likely traumatic related to fall with sustained rib fractures  s/p splenectomy 12/2 at [facility]   Jenifer drain and AZAEL drain in place  ACS consulted to follow post-op course  Fentanyl PRN pain  Monitor strict I&Os  Case was d/w ACS Attending Dr. Lui

## 2018-12-04 NOTE — DIETITIAN INITIAL EVALUATION ADULT. - NUTRITIONGOAL OUTCOME1
Pt will meet >75% of estimated protein-energy needs via tolerated route Pt will meet >75% of estimated protein needs via tolerated route

## 2018-12-04 NOTE — AIRWAY REMOVAL NOTE  ADULT & PEDS - ARTIFICAL AIRWAY REMOVAL COMMENTS
patient suctioned endotracheally and orally pre extubation and orally post. voice intact. tolerated well

## 2018-12-04 NOTE — PROGRESS NOTE ADULT - SUBJECTIVE AND OBJECTIVE BOX
INTERVAL HPI/OVERNIGHT EVENTS: Patient was taken off milrinone, started precedex for some agitation. Patient still on NE and vaso. Patient responding to verbal stimulus. Hgb yesterday 8.4 to 7.8, lactate 5.2 yesterday. AZAEL 40cc serosanguinous output      MEDICATIONS  (STANDING):  acetylcysteine IVPB 12 Gram(s) IV Intermittent every 24 hours  ascorbic acid IVPB 1500 milliGRAM(s) IV Intermittent every 6 hours  chlorhexidine 0.12% Liquid 15 milliLiter(s) Swish and Spit two times a day  dexmedetomidine Infusion 0.3 MICROgram(s)/kG/Hr (6.188 mL/Hr) IV Continuous <Continuous>  dextrose 5%. 1000 milliLiter(s) (50 mL/Hr) IV Continuous <Continuous>  dextrose 50% Injectable 12.5 Gram(s) IV Push once  dextrose 50% Injectable 25 Gram(s) IV Push once  dextrose 50% Injectable 25 Gram(s) IV Push once  esMOLOL  Infusion 50 MICROgram(s)/kG/Min (24.75 mL/Hr) IV Continuous <Continuous>  heparin  Infusion. 700 Unit(s)/Hr (7 mL/Hr) IV Continuous <Continuous>  hydrocortisone sodium succinate Injectable 50 milliGRAM(s) IV Push every 6 hours  influenza   Vaccine 0.5 milliLiter(s) IntraMuscular once  insulin Infusion 8 Unit(s)/Hr (8 mL/Hr) IV Continuous <Continuous>  multiple electrolytes Injection Type 1 1000 milliLiter(s) (70 mL/Hr) IV Continuous <Continuous>  norepinephrine Infusion 0.04 MICROgram(s)/kG/Min (6.188 mL/Hr) IV Continuous <Continuous>  pantoprazole  Injectable 40 milliGRAM(s) IV Push daily  piperacillin/tazobactam IVPB. 3.375 Gram(s) IV Intermittent every 8 hours  thiamine IVPB 200 milliGRAM(s) IV Intermittent <User Schedule>  vasopressin Infusion 0.04 Unit(s)/Min (2.4 mL/Hr) IV Continuous <Continuous>    MEDICATIONS  (PRN):  dextrose 40% Gel 15 Gram(s) Oral once PRN Blood Glucose LESS THAN 70 milliGRAM(s)/deciliter  fentaNYL    Injectable 50 MICROGram(s) IV Push every 2 hours PRN Moderate Pain (4 - 6)  glucagon  Injectable 1 milliGRAM(s) IntraMuscular once PRN Glucose LESS THAN 70 milligrams/deciliter  heparin  Injectable 6500 Unit(s) IV Push every 6 hours PRN For aPTT less than 40  heparin  Injectable 3000 Unit(s) IV Push every 6 hours PRN For aPTT between 40 - 57      Vital Signs Last 24 Hrs  T(C): 36.3 (04 Dec 2018 03:14), Max: 38.7 (03 Dec 2018 15:44)  T(F): 97.3 (04 Dec 2018 03:14), Max: 101.7 (03 Dec 2018 15:44)  HR: 79 (04 Dec 2018 08:00) (75 - 132)  BP: --  BP(mean): --  RR: 19 (04 Dec 2018 08:00) (19 - 31)  SpO2: 98% (04 Dec 2018 08:00) (80% - 100%)    PHYSICAL EXAM:  General: intubated  Respiratory: Ventilator Sounds  Cardiovascular: tachycardiac, S1/S2  Abdominal: soft, nondistended, incision clean and intact. EDUARDO dressing in place to midline.  AZAEL drain in place with serosanguinous output  : Mccann in place w/mild hematuria, rectal tube in place  Skin: Cool      I&O's Detail    03 Dec 2018 07:01  -  04 Dec 2018 07:00  --------------------------------------------------------  IN:    dexmedetomidine Infusion: 78.2 mL    esmolol Infusion: 1300.2 mL    heparin  Infusion.: 70 mL    heparin  Infusion.: 77 mL    insulin Infusion: 19 mL    milrinone  Infusion: 66.6 mL    multiple electrolytes Injection Type 1: 1680 mL    norepinephrine Infusion: 1307.3 mL    sodium bicarbonate  Infusion: 900 mL    Solution: 50 mL    Solution: 200 mL    Solution: 667.2 mL    Solution: 300 mL    Solution: 300 mL    vasopressin Infusion: 57.6 mL  Total IN: 7073.1 mL    OUT:    Bulb: 110 mL    Indwelling Catheter - Urethral: 405 mL    Nasoenteral Tube: 250 mL    Stool: 100 mL    Voided: 355 mL  Total OUT: 1220 mL    Total NET: 5853.1 mL          LABS:                        7.9    21.4  )-----------( x        ( 04 Dec 2018 05:38 )             23.2     12-04    137  |  95<L>  |  37.0<H>  ----------------------------<  80  4.4   |  20.0<L>  |  2.10<H>    Ca    7.1<L>      04 Dec 2018 05:38  Phos  5.1     12  Mg     2.1     12-    TPro  3.3<L>  /  Alb  1.4<L>  /  TBili  3.2<H>  /  DBili  x   /  AST  2165<H>  /  ALT  1607<H>  /  AlkPhos  101  12-    PT/INR - ( 03 Dec 2018 08:46 )   PT: 48.2 sec;   INR: 4.01 ratio         PTT - ( 04 Dec 2018 07:03 )  PTT:188.4 sec  Urinalysis Basic - ( 02 Dec 2018 17:43 )    Color: Shasta / Appearance: Slightly Turbid / S.010 / pH: x  Gluc: x / Ketone: Trace  / Bili: Small / Urobili: 4 mg/dL   Blood: x / Protein: 100 mg/dL / Nitrite: Negative   Leuk Esterase: Small / RBC: 25-50 /HPF / WBC 6-10   Sq Epi: x / Non Sq Epi: Few / Bacteria: Few        RADIOLOGY & ADDITIONAL STUDIES:

## 2018-12-04 NOTE — DIETITIAN INITIAL EVALUATION ADULT. - PROBLEM SELECTOR PLAN 6
Related to traumatic splenic laceration with acute blood loss anemia, now s/p splenectomy  Monitor H/H q4h while on anticoagulation for PE

## 2018-12-04 NOTE — DIETITIAN INITIAL EVALUATION ADULT. - NS AS NUTRI INTERV ENTERAL NUTRITION
Noted trickle feeds ordered yesterday (Jevity 1.5, 20 ml/hr). As medically feasible, resume feeds. Aware of possible extubation today- if unable to extubated, recommend increase tube feeds to goal rate of 60 ml/hr (x20 hrs) to provide 1200 ml, 1800 kcal, 77g protein, 912 ml free water, and >100% of RDIs for vitamins/minerals. Additional free water per MD discretion. If pt to be extubated, recommend SLP swallow eval prior to diet advancement Noted trickle feeds ordered yesterday (Jevity 1.5, 20 ml/hr). As medically feasible, resume feeds. Aware of possible extubation today- if unable to extubated, recommend change tube feeds to Pivot 1.5, goal rate of 60 ml/hr (x20 hrs) to provide 1200 ml, 1800 kcal, 113g protein, 911 ml free water, and >100% of RDIs for vitamins/minerals. Additional free water per MD discretion. If pt to be extubated, recommend SLP swallow eval prior to diet advancement

## 2018-12-04 NOTE — DIETITIAN INITIAL EVALUATION ADULT. - PROBLEM SELECTOR PLAN 10
Likely shock liver in the setting of shock state vs passive hepatic congestion due to RV failure  Coagulopathic, thrombocytopenic  Monitor q4-6h INR, LFTs  Start on NAC   Steroids   Check ammonia level, acute hepatitis panel, r/o DIC  Hold Heparin gtt pending DIC w/u

## 2018-12-04 NOTE — DIETITIAN INITIAL EVALUATION ADULT. - PERTINENT MEDS FT
Heparin, Abx, IVF, Precedex, Vitamin C, Fentanyl, Insulin, Levophed, Protonix, Thiamine, Vasopressin

## 2018-12-04 NOTE — PROGRESS NOTE ADULT - PROBLEM SELECTOR PLAN 7
esmolol infusion titrate for HR<90  heparin infusion  cardioversion not performed because BEATA unsuccessful.
esmolol infusion titrate for HR<90  heparin infusion  BEATA/cardioversion tomorrow am.

## 2018-12-04 NOTE — PROGRESS NOTE ADULT - PROBLEM SELECTOR PLAN 6
s/p splenectomy.  will need to get vaccinations (not sure if happened at [place] -- calling today)
s/p splenectomy.  will need to get vaccinations (not sure if happened at [place])

## 2018-12-04 NOTE — PROGRESS NOTE ADULT - SUBJECTIVE AND OBJECTIVE BOX
CARDIOLOGY PROGRESS NOTE   ([facility] [place] [facility])                             Reason for follow up: Afib with RVR, submassive PE, possible obstructive shock                            Overnight: Milrinone weaned, rate better controlled    Subjective: intubated, , awake at times  Review of symptoms: unable to obtain  Respiratory: intubated.  Gastrointestinal: No diarrhea. No abdominal pain. No bleeding.     Past medical history: No updates.     	  Vitals:  ICU Vital Signs Last 24 Hrs  T(C): 36.5 (04 Dec 2018 08:00), Max: 38.7 (03 Dec 2018 15:44)  T(F): 97.7 (04 Dec 2018 08:00), Max: 101.7 (03 Dec 2018 15:44)  HR: 72 (04 Dec 2018 13:10) (69 - 130)  BP: --  BP(mean): --  ABP: 110/49 (04 Dec 2018 13:00) (99/41 - 195/65)  ABP(mean): 64 (04 Dec 2018 13:00) (57 - 98)  RR: 21 (04 Dec 2018 13:00) (17 - 31)  SpO2: 95% (04 Dec 2018 13:10) (80% - 100%)    I&O's Summary    03 Dec 2018 07:01  -  04 Dec 2018 07:00  --------------------------------------------------------  IN: 7073.1 mL / OUT: 1220 mL / NET: 5853.1 mL    04 Dec 2018 07:01  -  04 Dec 2018 13:44  --------------------------------------------------------  IN: 339.6 mL / OUT: 100 mL / NET: 239.6 mL          Weight (kg): 82.5 (12-02 @ 13:45)    PHYSICAL EXAM:  Appearance:  , intubated  HEENT:  atraumatic,   Neck: TLC in right IJ,   Neurologic: A & O x 3, no focal deficits  Lymphatic: No cervical lymphadenopathy  Cardiovascular: Normal S1 S2, No murmur, rubs/gallops  Respiratory: Lungs clear to auscultation anteriorly  Gastrointestinal:  Soft, Non-tender,  no HSM  Lower Extremities: No edema, DP and PT +2  Psychiatry: intubated   Skin: No rashes, No ecchymoses, No cyanosis    CURRENT MEDICATIONS:  MEDICATIONS  (STANDING):  esMOLOL  Infusion 50 MICROgram(s)/kG/Min IV Continuous <Continuous>  norepinephrine Infusion 0.04 MICROgram(s)/kG/Min IV Continuous <Continuous>  pantoprazole  Injectable  piperacillin/tazobactam IVPB.  acetylcysteine IVPB  ascorbic acid IVPB  chlorhexidine 0.12% Liquid  dexmedetomidine Infusion  dextrose 5%.  dextrose 50% Injectable  dextrose 50% Injectable  dextrose 50% Injectable  heparin  Infusion.  hydrocortisone sodium succinate Injectable  influenza   Vaccine  insulin Infusion  multiple electrolytes Injection Type 1  thiamine IVPB  vasopressin Infusion    PRN: dextrose 40% Gel PRN  fentaNYL    Injectable PRN  glucagon  Injectable PRN  heparin  Injectable PRN  heparin  Injectable PRN        LABS:	 	                                     7.9    21.4  )-----------( 172      ( 04 Dec 2018 05:38 )             23.2   N=x    ; L=x        04 Dec 2018 05:38    137    |  95     |  37.0   ----------------------------<  80     4.4     |  20.0   |  2.10     Ca    7.1        04 Dec 2018 05:38  Phos  5.1       04 Dec 2018 05:38  Mg     2.1       04 Dec 2018 05:38    TPro  3.3    /  Alb  1.4    /  TBili  3.2    /  DBili  x      /  AST  2165   /  ALT  1607   /  AlkPhos  101    04 Dec 2018 05:38      Hepatic panel: 04 Dec 2018 05:38  3.3   | 1.4                            3.2   | 3.2  /x                              2165  | 1607                              /101  \par                                   TELEMETRY: Afib rate controlled	        DIAGNOSTIC TESTING:  [ ] Echocardiogram:  < from: TTE Echo Complete w/Doppler (12.02.18 @ 15:11) >  Summary:   1. Left ventricular ejection fraction, by visual estimation, is 55 to   60%.   2. Technically fair study.   3. The mitral in-flow pattern reveals no discernable A-wave, therefore   no comment on diastolic function can be made.   4. There is mild septal left ventricular hypertrophy.   5. Moderately reduced RV systolic function.   6. Thickening of the anterior and posterior mitral valve leaflets.   7. Mild-moderate tricuspid regurgitation.   8. Mild aortic regurgitation.   9. Sclerotic aortic valve with normal opening.  10. Pulmonic valve regurgitation.  11. Estimated pulmonary artery systolic pressure is 41.9 mmHg assuming a   right atrial pressure of 8 mmHg, which is consistent with mild pulmonary   hypertension.    < end of copied text >

## 2018-12-04 NOTE — DIETITIAN INITIAL EVALUATION ADULT. - PROBLEM SELECTOR PLAN 7
Component of septic shock?   Asplenic  [place] [facility] [place] UCx and BCx [place]  Continue Zosyn for now  Trend leukocytosis and fever curve

## 2018-12-04 NOTE — DIETITIAN INITIAL EVALUATION ADULT. - PROBLEM SELECTOR PLAN 8
Likely related to hypoperfusion during shock state  Continue to monitor renal function and electrolytes  Replace electrolytes as needed  Indwelling hassan to monitor I&Os, daily weights  Renally dose meds  Avoid nephrotoxic agents

## 2018-12-04 NOTE — DIETITIAN INITIAL EVALUATION ADULT. - OTHER INFO
77y Female with respiratory failure, cor pulmonale, a fib, splenic laceration s/p splenectomy, Shock, MELISSA, and UTI. Pt intubated and sedated, currently undergoing BEATA.

## 2018-12-04 NOTE — DIETITIAN INITIAL EVALUATION ADULT. - PROBLEM SELECTOR PLAN 1
Not a candidate for TPA lysis given splenectomy in last 24h   On systemic heparin for full anticoagulation, will aim to keep PTT threshold lower in the setting of recent hemoperitoneum   Cardiology eval for possible interventional thrombectomy, case was d/w Dr. Vann and Dr. Carney, will re-eval in AM  check EKG, troponin, BNP  Bedside POCUS with good LV function, RV dilation. Will hold off on Inotropes for now especially in the setting of tachycardia  f/u formal TTE  If patient developes worsening cardiogenic shock, worsening endpoints of perfusion, will reeval for addition of inotropic agent  Monitor oxygenation, on minimal FiO2 at this time, attempt to wean to extubate to improve hemodynamics

## 2018-12-04 NOTE — PROGRESS NOTE ADULT - PROBLEM SELECTOR PLAN 3
continued improvement.  continue to trend and optimize perfusion.  repeated lactic acid today; 4 -- ?combination type a/b?

## 2018-12-05 NOTE — SWALLOW BEDSIDE ASSESSMENT ADULT - SWALLOW EVAL: DIAGNOSIS
Oral and pharyngeal stage of swallow judged to be WFL for puree with honey liquids. +coughing on thin liquids

## 2018-12-05 NOTE — SWALLOW BEDSIDE ASSESSMENT ADULT - SLP PERTINENT HISTORY OF CURRENT PROBLEM
As per h&p: pt was feeling short of breath mid-November, travelled to [place] by airplane just prior to Thanksgiving, diagnosed with PE and started on NOAc and subsequently had fall and ambulatory dysfunction. They optimized her O2 for travel back but a day or so after coming back she went to [facility] [place] for abdominal pain and was found to have splenic rupture with PE and cor pulmonale and underwent splenectomy . Pt extubated on 12/4/18

## 2018-12-05 NOTE — PROGRESS NOTE ADULT - SUBJECTIVE AND OBJECTIVE BOX
Patient is a 77y old  Female who presents with a chief complaint of Pulmonary Emboli with Cor Pulmonale, Splenic laceration s/p splenectomy, hemoperitoneum, acute blood loss anemia, shock (05 Dec 2018 09:25)    PAST MEDICAL & SURGICAL HISTORY:  Anxiety  Gastroesophageal reflux disease, esophagitis presence not specified  Hypertension  Sjogren's syndrome  Non-Hodgkin lymphoma  History of lobectomy of lung: left lower lobe    BENNY POWERS   77y    Female    BRIEF HOSPITAL COURSE:    Review of Systems:                       All other ROS are negative.    Allergies    Allergy Status Unknown    Intolerances          ICU Vital Signs Last 24 Hrs  T(C): 36.6 (05 Dec 2018 20:00), Max: 36.8 (04 Dec 2018 23:29)  T(F): 97.8 (05 Dec 2018 20:00), Max: 98.2 (04 Dec 2018 23:29)  HR: 69 (05 Dec 2018 23:00) (66 - 125)  BP: --  BP(mean): --  ABP: 108/54 (05 Dec 2018 23:00) (84/46 - 183/60)  ABP(mean): 72 (05 Dec 2018 23:00) (49 - 99)  RR: 15 (05 Dec 2018 23:00) (0 - 33)  SpO2: 97% (05 Dec 2018 23:00) (91% - 100%)    Physical Examination:    General:     HEENT:     PULM:     CVS:     ABD:     EXT:     SKIN:     Neuro:          LABS:                        7.0    25.4  )-----------( 166      ( 05 Dec 2018 03:47 )             21.0     12-05    136  |  93<L>  |  45.0<H>  ----------------------------<  76  4.2   |  22.0  |  2.30<H>    Ca    7.7<L>      05 Dec 2018 03:47  Phos  5.3     12-05  Mg     2.1     12-05    TPro  3.3<L>  /  Alb  1.7<L>  /  TBili  3.7<H>  /  DBili  3.0<H>  /  AST  985<H>  /  ALT  981<H>  /  AlkPhos  105  12-05          CAPILLARY BLOOD GLUCOSE      POCT Blood Glucose.: 107 mg/dL (05 Dec 2018 01:11)    PT/INR - ( 05 Dec 2018 11:39 )   PT: 16.7 sec;   INR: 1.44 ratio         PTT - ( 05 Dec 2018 18:21 )  PTT:63.7 sec    CULTURES:  Culture Results:   No growth (12-03 @ 09:12)  Culture Results:   No growth at 48 hours (12-03 @ 08:47)  Culture Results:   No growth at 48 hours (12-02 @ 17:26)      Medications:  MEDICATIONS  (STANDING):  ascorbic acid IVPB 1500 milliGRAM(s) IV Intermittent every 6 hours  dextrose 5%. 1000 milliLiter(s) (50 mL/Hr) IV Continuous <Continuous>  dextrose 50% Injectable 12.5 Gram(s) IV Push once  dextrose 50% Injectable 25 Gram(s) IV Push once  dextrose 50% Injectable 25 Gram(s) IV Push once  DULoxetine 60 milliGRAM(s) Oral daily  esMOLOL  Infusion 50 MICROgram(s)/kG/Min (24.75 mL/Hr) IV Continuous <Continuous>  heparin  Infusion. 700 Unit(s)/Hr (7 mL/Hr) IV Continuous <Continuous>  hydrocortisone sodium succinate Injectable 50 milliGRAM(s) IV Push every 6 hours  influenza   Vaccine 0.5 milliLiter(s) IntraMuscular once  metoprolol tartrate 12.5 milliGRAM(s) Oral two times a day  midodrine 5 milliGRAM(s) Oral three times a day  multiple electrolytes Injection Type 1 1000 milliLiter(s) (70 mL/Hr) IV Continuous <Continuous>  norepinephrine Infusion 0.04 MICROgram(s)/kG/Min (6.188 mL/Hr) IV Continuous <Continuous>  piperacillin/tazobactam IVPB. 3.375 Gram(s) IV Intermittent every 12 hours  thiamine IVPB 200 milliGRAM(s) IV Intermittent <User Schedule>  vasopressin Infusion 0.04 Unit(s)/Min (2.4 mL/Hr) IV Continuous <Continuous>    MEDICATIONS  (PRN):  dextrose 40% Gel 15 Gram(s) Oral once PRN Blood Glucose LESS THAN 70 milliGRAM(s)/deciliter  fentaNYL    Injectable 50 MICROGram(s) IV Push every 2 hours PRN Moderate Pain (4 - 6)  glucagon  Injectable 1 milliGRAM(s) IntraMuscular once PRN Glucose LESS THAN 70 milligrams/deciliter  heparin  Injectable 6500 Unit(s) IV Push every 6 hours PRN For aPTT less than 40  heparin  Injectable 3000 Unit(s) IV Push every 6 hours PRN For aPTT between 40 - 57        12-04 @ 07:01 - 12-05 @ 07:00  --------------------------------------------------------  IN: 3991.9 mL / OUT: 1535 mL / NET: 2456.9 mL    12-05 @ 07:01 - 12-05 @ 23:22  --------------------------------------------------------  IN: 991 mL / OUT: 320 mL / NET: 671 mL        RADIOLOGY/IMAGING/ECHO    Critical care point of care ultrasound:    Assessment/Plan:          CRITICAL CARE TIME SPENT: 37 minutes assessing presenting problems of acute illness, which pose high probability of life threatening deterioration or end organ damage/dysfunction, as well as medical decision making including initiating plan of care, reviewing data, reviewing radiologic exams, discussing with multidisciplinary team,  discussing goals of care with patient/family, and writing this note.  Non-inclusive of procedures performed, Patient is a 77y old  Female who presents with a chief complaint of Pulmonary Emboli with Cor Pulmonale, Splenic laceration s/p splenectomy, hemoperitoneum, acute blood loss anemia, shock (05 Dec 2018 09:25)    PAST MEDICAL & SURGICAL HISTORY:  Anxiety  Gastroesophageal reflux disease, esophagitis presence not specified  Hypertension  Sjogren's syndrome  Non-Hodgkin lymphoma  History of lobectomy of lung: left lower lobe    BENNY POWERS   77y    Female    BRIEF HOSPITAL COURSE:    77y old  Female who presents with a chief complaint of Pulmonary Emboli with Cor Pulmonale, Splenic laceration s/p splenectomy, hemoperitoneum, acute blood loss anemia, shock.    Extubated 12/4  Pressors off 12/5        Review of Systems:   +SOB                    All other ROS are negative.    Allergies    Allergy Status Unknown    Intolerances          ICU Vital Signs Last 24 Hrs  T(C): 36.6 (05 Dec 2018 20:00), Max: 36.8 (04 Dec 2018 23:29)  T(F): 97.8 (05 Dec 2018 20:00), Max: 98.2 (04 Dec 2018 23:29)  HR: 69 (05 Dec 2018 23:00) (66 - 125)  BP: --  BP(mean): --  ABP: 108/54 (05 Dec 2018 23:00) (84/46 - 183/60)  ABP(mean): 72 (05 Dec 2018 23:00) (49 - 99)  RR: 15 (05 Dec 2018 23:00) (0 - 33)  SpO2: 97% (05 Dec 2018 23:00) (91% - 100%)    Physical Examination:    General:   Mild resp distress    HEENT: RIJ TLC    PULM: bilateral BS    CVS: s1 s2 irreg    ABD: soft    EXT:  + edema     SKIN: warm    Neuro:  Lethargic          LABS:                        7.0    25.4  )-----------( 166      ( 05 Dec 2018 03:47 )             21.0     12-05    136  |  93<L>  |  45.0<H>  ----------------------------<  76  4.2   |  22.0  |  2.30<H>    Ca    7.7<L>      05 Dec 2018 03:47  Phos  5.3     12-05  Mg     2.1     12-05    TPro  3.3<L>  /  Alb  1.7<L>  /  TBili  3.7<H>  /  DBili  3.0<H>  /  AST  985<H>  /  ALT  981<H>  /  AlkPhos  105  12-05          CAPILLARY BLOOD GLUCOSE      POCT Blood Glucose.: 107 mg/dL (05 Dec 2018 01:11)    PT/INR - ( 05 Dec 2018 11:39 )   PT: 16.7 sec;   INR: 1.44 ratio         PTT - ( 05 Dec 2018 18:21 )  PTT:63.7 sec    CULTURES:  Culture Results:   No growth (12-03 @ 09:12)  Culture Results:   No growth at 48 hours (12-03 @ 08:47)  Culture Results:   No growth at 48 hours (12-02 @ 17:26)      Medications:  MEDICATIONS  (STANDING):  ascorbic acid IVPB 1500 milliGRAM(s) IV Intermittent every 6 hours  dextrose 5%. 1000 milliLiter(s) (50 mL/Hr) IV Continuous <Continuous>  dextrose 50% Injectable 12.5 Gram(s) IV Push once  dextrose 50% Injectable 25 Gram(s) IV Push once  dextrose 50% Injectable 25 Gram(s) IV Push once  DULoxetine 60 milliGRAM(s) Oral daily  esMOLOL  Infusion 50 MICROgram(s)/kG/Min (24.75 mL/Hr) IV Continuous <Continuous>  heparin  Infusion. 700 Unit(s)/Hr (7 mL/Hr) IV Continuous <Continuous>  hydrocortisone sodium succinate Injectable 50 milliGRAM(s) IV Push every 6 hours  influenza   Vaccine 0.5 milliLiter(s) IntraMuscular once  metoprolol tartrate 12.5 milliGRAM(s) Oral two times a day  midodrine 5 milliGRAM(s) Oral three times a day  multiple electrolytes Injection Type 1 1000 milliLiter(s) (70 mL/Hr) IV Continuous <Continuous>  norepinephrine Infusion 0.04 MICROgram(s)/kG/Min (6.188 mL/Hr) IV Continuous <Continuous>  piperacillin/tazobactam IVPB. 3.375 Gram(s) IV Intermittent every 12 hours  thiamine IVPB 200 milliGRAM(s) IV Intermittent <User Schedule>  vasopressin Infusion 0.04 Unit(s)/Min (2.4 mL/Hr) IV Continuous <Continuous>    MEDICATIONS  (PRN):  dextrose 40% Gel 15 Gram(s) Oral once PRN Blood Glucose LESS THAN 70 milliGRAM(s)/deciliter  fentaNYL    Injectable 50 MICROGram(s) IV Push every 2 hours PRN Moderate Pain (4 - 6)  glucagon  Injectable 1 milliGRAM(s) IntraMuscular once PRN Glucose LESS THAN 70 milligrams/deciliter  heparin  Injectable 6500 Unit(s) IV Push every 6 hours PRN For aPTT less than 40  heparin  Injectable 3000 Unit(s) IV Push every 6 hours PRN For aPTT between 40 - 57        12-04 @ 07:01  -  12-05 @ 07:00  --------------------------------------------------------  IN: 3991.9 mL / OUT: 1535 mL / NET: 2456.9 mL    12-05 @ 07:01  -  12-05 @ 23:22  --------------------------------------------------------  IN: 991 mL / OUT: 320 mL / NET: 671 mL        RADIOLOGY/IMAGING/ECHO      < from: Xray Chest 1 View-PORTABLE IMMEDIATE (12.04.18 @ 14:12) >  MPRESSION:    No interval change since CXR of 12/2/18, given differences in technique.    Partially imaged enteric tube, with tip inferior to the diaphragm.    Unchanged endotracheal tube, in situ.    Unchanged right jugular CVC with tip projecting over the right atrium.    Unchanged small-moderate left pleural effusion. Persistent right basilar   atelectasis or infiltrate.        < end of copied text >  < from: BEATA Echo Doppler (12.04.18 @ 11:01) >  [id number] Heber Berry MD, Electronically signed on 12/5/2018 at 10:33:45   AM        < from: TTE Echo Complete w/Doppler (12.02.18 @ 15:11) >  0%.   2. Technically fair study.   3. The mitral in-flow pattern reveals no discernable A-wave, therefore   no comment on diastolic function can be made.   4. There is mild septal left ventricular hypertrophy.   5. Moderately reduced RV systolic function.   6. Thickening of the anterior and posterior mitral valve leaflets.   7. Mild-moderate tricuspid regurgitation.   8. Mild aortic regurgitation.   9. Sclerotic aortic valve with normal opening.  10. Pulmonic valve regurgitation.  11. Estimated pulmonary artery systolic pressure is 41.9 mmHg assuming a   right atrial pressure of 8 mmHg, which is consistent with mild pulmonary   hypertension.    < end of copied text >    Assessment/Plan:          CRITICAL CARE TIME SPENT: 37 minutes assessing presenting problems of acute illness, which pose high probability of life threatening deterioration or end organ damage/dysfunction, as well as medical decision making including initiating plan of care, reviewing data, reviewing radiologic exams, discussing with multidisciplinary team,  discussing goals of care with patient/family, and writing this note.  Non-inclusive of procedures performed, Patient is a 77y old  Female who presents with a chief complaint of Pulmonary Emboli with Cor Pulmonale, Splenic laceration s/p splenectomy, hemoperitoneum, acute blood loss anemia, shock (05 Dec 2018 09:25)    PAST MEDICAL & SURGICAL HISTORY:  Anxiety  Gastroesophageal reflux disease, esophagitis presence not specified  Hypertension  Sjogren's syndrome  Non-Hodgkin lymphoma  History of lobectomy of lung: left lower lobe    BENNY POWERS   77y    Female    BRIEF HOSPITAL COURSE:    77y old  Female who presents with a chief complaint of Pulmonary Emboli with Cor Pulmonale, Splenic laceration s/p splenectomy, hemoperitoneum, acute blood loss anemia, shock.    Extubated 12/4  Pressors off 12/5        Review of Systems:   +SOB                    All other ROS are negative.    Allergies    Allergy Status Unknown    Intolerances          ICU Vital Signs Last 24 Hrs  T(C): 36.6 (05 Dec 2018 20:00), Max: 36.8 (04 Dec 2018 23:29)  T(F): 97.8 (05 Dec 2018 20:00), Max: 98.2 (04 Dec 2018 23:29)  HR: 69 (05 Dec 2018 23:00) (66 - 125)  BP: --  BP(mean): --  ABP: 108/54 (05 Dec 2018 23:00) (84/46 - 183/60)  ABP(mean): 72 (05 Dec 2018 23:00) (49 - 99)  RR: 15 (05 Dec 2018 23:00) (0 - 33)  SpO2: 97% (05 Dec 2018 23:00) (91% - 100%)    Physical Examination:    General:   Mild resp distress    HEENT: RIJ TLC    PULM: bilateral BS    CVS: s1 s2 irreg    ABD: soft    EXT:  + edema     SKIN: warm    Neuro:  Lethargic          LABS:                        7.0    25.4  )-----------( 166      ( 05 Dec 2018 03:47 )             21.0     12-05    136  |  93<L>  |  45.0<H>  ----------------------------<  76  4.2   |  22.0  |  2.30<H>    Ca    7.7<L>      05 Dec 2018 03:47  Phos  5.3     12-05  Mg     2.1     12-05    TPro  3.3<L>  /  Alb  1.7<L>  /  TBili  3.7<H>  /  DBili  3.0<H>  /  AST  985<H>  /  ALT  981<H>  /  AlkPhos  105  12-05          CAPILLARY BLOOD GLUCOSE      POCT Blood Glucose.: 107 mg/dL (05 Dec 2018 01:11)    PT/INR - ( 05 Dec 2018 11:39 )   PT: 16.7 sec;   INR: 1.44 ratio         PTT - ( 05 Dec 2018 18:21 )  PTT:63.7 sec    CULTURES:  Culture Results:   No growth (12-03 @ 09:12)  Culture Results:   No growth at 48 hours (12-03 @ 08:47)  Culture Results:   No growth at 48 hours (12-02 @ 17:26)      Medications:  MEDICATIONS  (STANDING):  ascorbic acid IVPB 1500 milliGRAM(s) IV Intermittent every 6 hours  dextrose 5%. 1000 milliLiter(s) (50 mL/Hr) IV Continuous <Continuous>  dextrose 50% Injectable 12.5 Gram(s) IV Push once  dextrose 50% Injectable 25 Gram(s) IV Push once  dextrose 50% Injectable 25 Gram(s) IV Push once  DULoxetine 60 milliGRAM(s) Oral daily  esMOLOL  Infusion 50 MICROgram(s)/kG/Min (24.75 mL/Hr) IV Continuous <Continuous>  heparin  Infusion. 700 Unit(s)/Hr (7 mL/Hr) IV Continuous <Continuous>  hydrocortisone sodium succinate Injectable 50 milliGRAM(s) IV Push every 6 hours  influenza   Vaccine 0.5 milliLiter(s) IntraMuscular once  metoprolol tartrate 12.5 milliGRAM(s) Oral two times a day  midodrine 5 milliGRAM(s) Oral three times a day  multiple electrolytes Injection Type 1 1000 milliLiter(s) (70 mL/Hr) IV Continuous <Continuous>  norepinephrine Infusion 0.04 MICROgram(s)/kG/Min (6.188 mL/Hr) IV Continuous <Continuous>  piperacillin/tazobactam IVPB. 3.375 Gram(s) IV Intermittent every 12 hours  thiamine IVPB 200 milliGRAM(s) IV Intermittent <User Schedule>  vasopressin Infusion 0.04 Unit(s)/Min (2.4 mL/Hr) IV Continuous <Continuous>    MEDICATIONS  (PRN):  dextrose 40% Gel 15 Gram(s) Oral once PRN Blood Glucose LESS THAN 70 milliGRAM(s)/deciliter  fentaNYL    Injectable 50 MICROGram(s) IV Push every 2 hours PRN Moderate Pain (4 - 6)  glucagon  Injectable 1 milliGRAM(s) IntraMuscular once PRN Glucose LESS THAN 70 milligrams/deciliter  heparin  Injectable 6500 Unit(s) IV Push every 6 hours PRN For aPTT less than 40  heparin  Injectable 3000 Unit(s) IV Push every 6 hours PRN For aPTT between 40 - 57        12-04 @ 07:01  -  12-05 @ 07:00  --------------------------------------------------------  IN: 3991.9 mL / OUT: 1535 mL / NET: 2456.9 mL    12-05 @ 07:01  -  12-05 @ 23:22  --------------------------------------------------------  IN: 991 mL / OUT: 320 mL / NET: 671 mL        RADIOLOGY/IMAGING/ECHO      < from: Xray Chest 1 View-PORTABLE IMMEDIATE (12.04.18 @ 14:12) >  MPRESSION:    No interval change since CXR of 12/2/18, given differences in technique.    Partially imaged enteric tube, with tip inferior to the diaphragm.    Unchanged endotracheal tube, in situ.    Unchanged right jugular CVC with tip projecting over the right atrium.    Unchanged small-moderate left pleural effusion. Persistent right basilar   atelectasis or infiltrate.        < end of copied text >  < from: BEATA Echo Doppler (12.04.18 @ 11:01) >  [id number] Heber Berry MD, Electronically signed on 12/5/2018 at 10:33:45   AM        < from: TTE Echo Complete w/Doppler (12.02.18 @ 15:11) >  0%.   2. Technically fair study.   3. The mitral in-flow pattern reveals no discernable A-wave, therefore   no comment on diastolic function can be made.   4. There is mild septal left ventricular hypertrophy.   5. Moderately reduced RV systolic function.   6. Thickening of the anterior and posterior mitral valve leaflets.   7. Mild-moderate tricuspid regurgitation.   8. Mild aortic regurgitation.   9. Sclerotic aortic valve with normal opening.  10. Pulmonic valve regurgitation.  11. Estimated pulmonary artery systolic pressure is 41.9 mmHg assuming a   right atrial pressure of 8 mmHg, which is consistent with mild pulmonary   hypertension.    < end of copied text >    Assessment/Plan:    77F NHL in  remission recent PE on NOAC had fall last week in [place],  presented to [facility] 12/1 with abd pain shock splenic lac with hematoma s/p splenectomy,      Sent to [facility]  12/2 in consideration of EKOS as she was found to have RV strain on echo in the setting of what was presumed obstructive shock, with R PA PE.    Developed a fib with RVR.  Failed attempt at cardioversion 12/4  now rate controlled.     RX for septic over obstructive shock Now off pressors.  E coli sepsis  per directors note from [facility] cultures.  Here cultures negative.   Leah protocol to complete course        Full AC  7 day abx

## 2018-12-05 NOTE — PROGRESS NOTE ADULT - SUBJECTIVE AND OBJECTIVE BOX
Patient is a 77y old  Female who presents with a chief complaint of Pulmonary Emboli with Cor Pulmonale, Splenic laceration s/p splenectomy, hemoperitoneum, acute blood loss anemia, shock (04 Dec 2018 15:36)      BRIEF HOSPITAL COURSE: 78 yo female, PMHx non-Hodgkin's lymphoma, Sjogren's, HTN, GERD, anxiety, s/p LLL lobectomy, who was feeling short of breath mid-November, travelled to [place] by airplane just prior to Thanksgiving, diagnosed with PE and started on NOAc and subsequently had fall and ambulatory dysfunction. They optimized her O2 for travel back but a day or so after coming back she went to [facility] [place] pain and was found to have splenic rupture with PE and cor pulmonale and underwent splenectomy yesterday and was hypotensive earlier this morning.   Cardiology was consulted for that reason and she was accepted to [facility] MICU for evaluation for non-invasive thrombectomy.   She was transferred on levo and FD vasopressin and has remains intubated on MV.     Events last 24 hours:   Got extubated successfully yesterday   Remains on Pressors and decreasing dose of esmolol   Denied any complaints besides being tired       PAST MEDICAL & SURGICAL HISTORY:  Anxiety  Gastroesophageal reflux disease, esophagitis presence not specified  Hypertension  Sjogren's syndrome  Non-Hodgkin lymphoma  History of lobectomy of lung: left lower lobe      Review of Systems:  11 systems reviewed, no other symptoms besides what is explained above    Medications:  piperacillin/tazobactam IVPB. 3.375 Gram(s) IV Intermittent every 12 hours  esMOLOL  Infusion 50 MICROgram(s)/kG/Min IV Continuous <Continuous>  metoprolol tartrate 12.5 milliGRAM(s) Oral two times a day  midodrine 5 milliGRAM(s) Oral three times a day  norepinephrine Infusion 0.04 MICROgram(s)/kG/Min IV Continuous <Continuous>  fentaNYL    Injectable 50 MICROGram(s) IV Push every 2 hours PRN  heparin  Infusion. 700 Unit(s)/Hr IV Continuous <Continuous>  heparin  Injectable 6500 Unit(s) IV Push every 6 hours PRN  heparin  Injectable 3000 Unit(s) IV Push every 6 hours PRN  pantoprazole  Injectable 40 milliGRAM(s) IV Push daily  dextrose 40% Gel 15 Gram(s) Oral once PRN  dextrose 50% Injectable 12.5 Gram(s) IV Push once  dextrose 50% Injectable 25 Gram(s) IV Push once  dextrose 50% Injectable 25 Gram(s) IV Push once  glucagon  Injectable 1 milliGRAM(s) IntraMuscular once PRN  hydrocortisone sodium succinate Injectable 50 milliGRAM(s) IV Push every 6 hours  vasopressin Infusion 0.04 Unit(s)/Min IV Continuous <Continuous>  ascorbic acid IVPB 1500 milliGRAM(s) IV Intermittent every 6 hours  dextrose 5%. 1000 milliLiter(s) IV Continuous <Continuous>  multiple electrolytes Injection Type 1 1000 milliLiter(s) IV Continuous <Continuous>  thiamine IVPB 200 milliGRAM(s) IV Intermittent <User Schedule>  influenza   Vaccine 0.5 milliLiter(s) IntraMuscular once  chlorhexidine 0.12% Liquid 15 milliLiter(s) Swish and Spit two times a day    Mode: standby      ICU Vital Signs Last 24 Hrs  T(C): 36.8 (05 Dec 2018 06:05), Max: 36.8 (04 Dec 2018 23:29)  T(F): 98.2 (05 Dec 2018 06:05), Max: 98.2 (04 Dec 2018 23:29)  HR: 69 (05 Dec 2018 08:00) (66 - 111)  BP: --  BP(mean): --  ABP: 107/38 (05 Dec 2018 08:00) (90/35 - 183/60)  ABP(mean): 57 (05 Dec 2018 08:00) (49 - 97)  RR: 17 (05 Dec 2018 08:00) (0 - 38)  SpO2: 98% (05 Dec 2018 08:00) (87% - 100%)    I&O's Detail    04 Dec 2018 07:01  -  05 Dec 2018 07:00  --------------------------------------------------------  IN:    dexmedetomidine Infusion: 72.1 mL    esmolol Infusion: 232.6 mL    heparin  Infusion.: 22 mL    insulin Infusion: 15.5 mL    multiple electrolytes Injection Type 1: 1610 mL    norepinephrine Infusion: 809.1 mL    Solution: 275 mL    Solution: 200 mL    Solution: 500.4 mL    Solution: 200 mL    vasopressin Infusion: 55.2 mL  Total IN: 3991.9 mL    OUT:    Bulb: 210 mL    Indwelling Catheter - Urethral: 875 mL    Nasoenteral Tube: 100 mL    Stool: 350 mL  Total OUT: 1535 mL    Total NET: 2456.9 mL            LABS:                        7.0    25.4  )-----------( 166      ( 05 Dec 2018 03:47 )             21.0     12-05    136  |  93<L>  |  45.0<H>  ----------------------------<  76  4.2   |  22.0  |  2.30<H>    Ca    7.7<L>      05 Dec 2018 03:47  Phos  5.3     12-05  Mg     2.1     12-05    TPro  3.3<L>  /  Alb  1.7<L>  /  TBili  3.7<H>  /  DBili  3.0<H>  /  AST  x   /  ALT  x   /  AlkPhos  105  12-05      CARDIAC MARKERS ( 03 Dec 2018 17:01 )  x     / x     / 810 U/L / x     / 12.8 ng/mL      CAPILLARY BLOOD GLUCOSE      POCT Blood Glucose.: 107 mg/dL (05 Dec 2018 01:11)    PTT - ( 05 Dec 2018 03:49 )  PTT:68.2 sec    CULTURES:  Culture Results:   No growth (12-03-18 @ 09:12)  Culture Results:   No growth at 48 hours (12-03-18 @ 08:47)  Culture Results:   No growth at 48 hours (12-02-18 @ 17:26)      Physical Examination:    General: No acute distress.  ill appearing female who is lethargic but arousable and follows simple commands oriented * 1-2    HEENT: Pupils equal, reactive to light.  Symmetric.    PULM: Clear to auscultation bilaterally, no significant sputum production    CVS: Iregular rate and rhythm, no murmurs, rubs, or gallops    ABD: Soft, distended, tender, normoactive bowel sounds, no masses, AZAEL+    EXT: No edema, nontender    SKIN: Warm and well perfused, no rashes noted.      CRITICAL CARE TIME SPENT:40 min

## 2018-12-05 NOTE — SWALLOW BEDSIDE ASSESSMENT ADULT - SWALLOW EVAL: RECOMMENDED FEEDING/EATING TECHNIQUES
allow for swallow between intakes/small sips/bites/maintain upright posture during/after eating for 30 mins/oral hygiene/position upright (90 degrees)/crush medication (when feasible)/no straws

## 2018-12-05 NOTE — SWALLOW BEDSIDE ASSESSMENT ADULT - SLP GENERAL OBSERVATIONS
pt in bed awake, weak, Ox1, SPO2 96% via nasal cannula with spouse, daughter Jovita, son Reagan & grandson Reagan present

## 2018-12-06 NOTE — PROGRESS NOTE ADULT - SUBJECTIVE AND OBJECTIVE BOX
Patient is a 77y old  Female who presents with a chief complaint of Pulmonary Emboli with Cor Pulmonale, Splenic laceration s/p splenectomy, hemoperitoneum, acute blood loss anemia, shock (04 Dec 2018 15:36)      BRIEF HOSPITAL COURSE: 76 yo female, PMHx non-Hodgkin's lymphoma, Sjogren's, HTN, GERD, anxiety, s/p LLL lobectomy, who was feeling short of breath mid-November, travelled to [place] by airplane just prior to Thanksgiving, diagnosed with PE and started on NOAc and subsequently had fall and ambulatory dysfunction. They optimized her O2 for travel back but a day or so after coming back she went to [facility] for abdominal pain and was found to have splenic rupture with PE and cor pulmonale and underwent splenectomy yesterday and was hypotensive earlier this morning.   Cardiology was consulted for that reason and she was accepted to [facility] MICU for evaluation for non-invasive thrombectomy.   She was transferred on levo and FD vasopressin and has remains intubated on MV.     Events last 24 hours:     S/p Pressors and hypertensive today   Denied any complaints besides being tired and feet cramps       PAST MEDICAL & SURGICAL HISTORY:  Anxiety  Gastroesophageal reflux disease, esophagitis presence not specified  Hypertension  Sjogren's syndrome  Non-Hodgkin lymphoma  History of lobectomy of lung: left lower lobe      Review of Systems:  11 systems reviewed, no other symptoms besides what is explained above    MEDICATIONS  (STANDING):  cefTRIAXone   IVPB 1 Gram(s) IV Intermittent every 24 hours  dextrose 5%. 1000 milliLiter(s) (50 mL/Hr) IV Continuous <Continuous>  dextrose 50% Injectable 12.5 Gram(s) IV Push once  dextrose 50% Injectable 25 Gram(s) IV Push once  dextrose 50% Injectable 25 Gram(s) IV Push once  DULoxetine 60 milliGRAM(s) Oral daily  heparin  Infusion. 700 Unit(s)/Hr (7 mL/Hr) IV Continuous <Continuous>  influenza   Vaccine 0.5 milliLiter(s) IntraMuscular once  metoprolol tartrate 12.5 milliGRAM(s) Oral two times a day    MEDICATIONS  (PRN):  dextrose 40% Gel 15 Gram(s) Oral once PRN Blood Glucose LESS THAN 70 milliGRAM(s)/deciliter  fentaNYL    Injectable 50 MICROGram(s) IV Push every 2 hours PRN Moderate Pain (4 - 6)  glucagon  Injectable 1 milliGRAM(s) IntraMuscular once PRN Glucose LESS THAN 70 milligrams/deciliter  heparin  Injectable 6500 Unit(s) IV Push every 6 hours PRN For aPTT less than 40  heparin  Injectable 3000 Unit(s) IV Push every 6 hours PRN For aPTT between 40 - 57    ICU Vital Signs Last 24 Hrs  T(C): 36.8 (06 Dec 2018 11:00), Max: 36.8 (06 Dec 2018 11:00)  T(F): 98.3 (06 Dec 2018 11:00), Max: 98.3 (06 Dec 2018 11:00)  HR: 128 (06 Dec 2018 11:00) (63 - 128)  BP: --  BP(mean): --  ABP: 169/84 (06 Dec 2018 11:00) (84/46 - 173/60)  ABP(mean): 116 (06 Dec 2018 11:00) (57 - 116)  RR: 18 (06 Dec 2018 11:00) (14 - 94)  SpO2: 97% (06 Dec 2018 11:00) (88% - 100%)    I&O's Summary    05 Dec 2018 07:01  -  06 Dec 2018 07:00  --------------------------------------------------------  IN: 1479 mL / OUT: 730 mL / NET: 749 mL    06 Dec 2018 07:01  -  06 Dec 2018 11:57  --------------------------------------------------------  IN: 40 mL / OUT: 330 mL / NET: -290 mL                          9.1    17.3  )-----------( 158      ( 06 Dec 2018 05:47 )             26.5     12-06    140  |  96<L>  |  54.0<H>  ----------------------------<  57<L>  3.3<L>   |  21.0<L>  |  2.59<H>    Ca    8.5<L>      06 Dec 2018 05:47  Phos  6.0     12-06  Mg     2.2     12-06    TPro  3.3<L>  /  Alb  1.7<L>  /  TBili  3.7<H>  /  DBili  3.0<H>  /  AST  985<H>  /  ALT  981<H>  /  AlkPhos  105  12-05        Physical Examination:    General: No acute distress.  ill appearing female who is lethargic but arousable and follows simple commands oriented * 2    HEENT: Pupils equal, reactive to light.  Symmetric.    PULM: Clear to auscultation bilaterally, no significant sputum production    CVS: Iregular rate and rhythm, no murmurs, rubs, or gallops    ABD: Soft, distended, tender, normoactive bowel sounds, no masses, AZAEL+      EXT: 2 + B/l LE edema, nontender    SKIN: Warm and well perfused, no rashes noted besided multiple UE bruising with some blisters       CRITICAL CARE TIME SPENT:40 min

## 2018-12-06 NOTE — PROGRESS NOTE ADULT - SUBJECTIVE AND OBJECTIVE BOX
INTERVAL HPI/OVERNIGHT EVENTS: Patient extubated yesterday. Off pressors. Lactate normalized. AZAEL output 120cc    SUBJECTIVE: Feeling well this AM. No fevers/chills, no N/V.       MEDICATIONS  (STANDING):  ascorbic acid IVPB 1500 milliGRAM(s) IV Intermittent every 6 hours  dextrose 5%. 1000 milliLiter(s) (50 mL/Hr) IV Continuous <Continuous>  dextrose 50% Injectable 12.5 Gram(s) IV Push once  dextrose 50% Injectable 25 Gram(s) IV Push once  dextrose 50% Injectable 25 Gram(s) IV Push once  DULoxetine 60 milliGRAM(s) Oral daily  heparin  Infusion. 700 Unit(s)/Hr (7 mL/Hr) IV Continuous <Continuous>  hydrocortisone sodium succinate Injectable 50 milliGRAM(s) IV Push every 6 hours  influenza   Vaccine 0.5 milliLiter(s) IntraMuscular once  metoprolol tartrate 12.5 milliGRAM(s) Oral two times a day  midodrine 5 milliGRAM(s) Oral three times a day  piperacillin/tazobactam IVPB. 3.375 Gram(s) IV Intermittent every 12 hours  potassium chloride   Powder 20 milliEquivalent(s) Oral once  thiamine IVPB 200 milliGRAM(s) IV Intermittent <User Schedule>    MEDICATIONS  (PRN):  dextrose 40% Gel 15 Gram(s) Oral once PRN Blood Glucose LESS THAN 70 milliGRAM(s)/deciliter  fentaNYL    Injectable 50 MICROGram(s) IV Push every 2 hours PRN Moderate Pain (4 - 6)  glucagon  Injectable 1 milliGRAM(s) IntraMuscular once PRN Glucose LESS THAN 70 milligrams/deciliter  heparin  Injectable 6500 Unit(s) IV Push every 6 hours PRN For aPTT less than 40  heparin  Injectable 3000 Unit(s) IV Push every 6 hours PRN For aPTT between 40 - 57      Vital Signs Last 24 Hrs  T(C): 36.3 (06 Dec 2018 08:16), Max: 36.6 (05 Dec 2018 20:00)  T(F): 97.3 (06 Dec 2018 08:16), Max: 97.8 (05 Dec 2018 20:00)  HR: 64 (06 Dec 2018 08:00) (63 - 125)  BP: --  BP(mean): --  RR: 94 (06 Dec 2018 08:00) (14 - 94)  SpO2: 100% (06 Dec 2018 08:00) (91% - 100%)    PHYSICAL EXAM:  General: intubated  Respiratory: Ventilator Sounds  Cardiovascular: tachycardiac, S1/S2  Abdominal: soft, nondistended, incision clean and intact. EDUARDO dressing in place to midline.  AZAEL drain in place with serosanguinous output, stripped at bedside.       I&O's Detail    05 Dec 2018 07:01  -  06 Dec 2018 07:00  --------------------------------------------------------  IN:    esmolol Infusion: 18 mL    heparin  Infusion.: 50 mL    multiple electrolytes Injection Type 1multiple electrolytes Injection Type 1: 280 mL    norepinephrine Infusion: 84 mL    Oral Fluid: 60 mL    PRBCs (Packed Red Blood Cells): 300 mL    Solution: 175 mL    Solution: 300 mL    Solution: 200 mL    vasopressin Infusion: 12 mL  Total IN: 1479 mL    OUT:    Bulb: 230 mL    Stool: 500 mL  Total OUT: 730 mL    Total NET: 749 mL      06 Dec 2018 07:01  -  06 Dec 2018 08:42  --------------------------------------------------------  IN:    heparin  Infusion.: 5 mL    Solution: 25 mL  Total IN: 30 mL    OUT:  Total OUT: 0 mL    Total NET: 30 mL          LABS:                        9.1    17.3  )-----------( 158      ( 06 Dec 2018 05:47 )             26.5     12-06    140  |  96<L>  |  54.0<H>  ----------------------------<  57<L>  3.3<L>   |  21.0<L>  |  2.59<H>    Ca    8.5<L>      06 Dec 2018 05:47  Phos  6.0     12-06  Mg     2.2     12-06    TPro  3.3<L>  /  Alb  1.7<L>  /  TBili  3.7<H>  /  DBili  3.0<H>  /  AST  985<H>  /  ALT  981<H>  /  AlkPhos  105  12-05    PT/INR - ( 06 Dec 2018 05:47 )   PT: 13.5 sec;   INR: 1.17 ratio         PTT - ( 06 Dec 2018 05:47 )  PTT:54.4 sec      RADIOLOGY & ADDITIONAL STUDIES:

## 2018-12-06 NOTE — PHYSICAL THERAPY INITIAL EVALUATION ADULT - PERTINENT HX OF CURRENT PROBLEM, REHAB EVAL
76yo F diagnosed and treated in [facility] for PE. in [place], pt fell on left side; per family no injury reported. Pt Returned to [place], c/o sharp left upper abdominal pain with elevated HR. At [facility], CT imaging shows (+) right sided PE and Grade 2 splenic laceration c left rib 8-10 fx. She became hypotensive in ED, given 2u PRBCs. Pt did not respond appropriately and was taken to OR for emergent splenectomy. [facility] requested transfer for consideration of thrombectomy for PE due to cor pulomale

## 2018-12-06 NOTE — PHYSICAL THERAPY INITIAL EVALUATION ADULT - IMPAIRMENTS FOUND, PT EVAL
aerobic capacity/endurance/posture/ROM/muscle strength/arousal, attention, and cognition/anthropometric characteristics/gait, locomotion, and balance

## 2018-12-06 NOTE — PHYSICAL THERAPY INITIAL EVALUATION ADULT - CAPILLARY REFILL GENERAL
2+ DP pulse in RLE, difficult to find in LLE but Capillary refill < 3 seconds in left great toe/less than/equal to 3 secs

## 2018-12-06 NOTE — PHYSICAL THERAPY INITIAL EVALUATION ADULT - ADDITIONAL COMMENTS
Pt lives in a private home with her spouse. 5 steps to enter with handrails, no steps inside. Pt was independent PTA with RW. Pt owns RW, SAC and shower chair.

## 2018-12-07 NOTE — PROVIDER CONTACT NOTE (EICU) - ACTION/TREATMENT ORDERED:
Magnesium 2gm ordered
CLAIRE pineda ordered
Will not replete potassium as pt with continued MELISSA.

## 2018-12-07 NOTE — PROVIDER CONTACT NOTE (EICU) - RECOMMENDATIONS
Advised to start hypoglycemic protocol, bedside RN states bedside provider just outside room and that he will update him.

## 2018-12-07 NOTE — CHART NOTE - NSCHARTNOTEFT_GEN_A_CORE
Upon Nutritional Assessment by the Registered Dietitian your patient was determined to meet criteria / has evidence of the following diagnosis/diagnoses:          [ ]  Mild Protein Calorie Malnutrition        [ ]  Moderate Protein Calorie Malnutrition        [x] Severe Protein Calorie Malnutrition        [ ] Unspecified Protein Calorie Malnutrition        [ ] Underweight / BMI <19        [ ] Morbid Obesity / BMI > 40    Pt now presents with malnutrition (severe, acute) related to inability to consume sufficient protein-energy associated with NHL, s/p splenectomy, multiorgan failure, and lethargy and poor po intake as evidenced by <50% energy intake >/5 days, 3+ generalized edema, mild muscle loss of clavicles and shoulders, and mild fat loss of orbitals noted on NFPE.    Findings as based on:  •  Comprehensive nutrition assessment and consultation  •  Calorie counts (nutrient intake analysis)  •  Food acceptance and intake status from observations by staff  •  Follow up  •  Patient education  •  Intervention secondary to interdisciplinary rounds  •   concerns      Treatment:    The following diet has been recommended: If NGT to be placed, recommend Nepro, initiated at 20 ml/hr and advance 10 ml/hr q4 hrs until goal rate of 55 ml/hr (x20 hrs) to provide 1100 ml, 1980 kcal, 89g protein, 800 ml free water, and >100% of RDIs for vitamins/minerals. Additional free water per MD discretion. Recommend Nephro-Myrtle MVI and vitamin C 500mg daily as feasible.      PROVIDER Section:     By signing this assessment you are acknowledging and agree with the diagnosis/diagnoses assigned by the Registered Dietitian    Comments:

## 2018-12-07 NOTE — PROGRESS NOTE ADULT - SUBJECTIVE AND OBJECTIVE BOX
Patient is a 77y old  Female who presents with a chief complaint of Pulmonary Emboli with Cor Pulmonale, Splenic laceration s/p splenectomy, hemoperitoneum, acute blood loss anemia, shock (07 Dec 2018 09:28)    PAST MEDICAL & SURGICAL HISTORY:  Anxiety  Gastroesophageal reflux disease, esophagitis presence not specified  Hypertension  Sjogren's syndrome  Non-Hodgkin lymphoma  History of lobectomy of lung: left lower lobe    BENNY POWERS   77y    Female    BRIEF HOSPITAL COURSE:    Patient is a 77y old  Female who presents with a chief complaint of Pulmonary Emboli with Cor Pulmonale, Splenic laceration s/p splenectomy, hemoperitoneum, acute blood loss anemia, shock  MELISSA ischemic hepatitis.  (05 Dec 2018 09:25)    Extubated a few days ago shock resloved       Review of Systems:                       All other ROS are negative.    Allergies    Allergy Status Unknown    Intolerances          ICU Vital Signs Last 24 Hrs  T(C): 36.4 (07 Dec 2018 23:19), Max: 36.7 (07 Dec 2018 09:29)  T(F): 97.5 (07 Dec 2018 23:19), Max: 98 (07 Dec 2018 09:29)  HR: 70 (07 Dec 2018 22:30) (66 - 83)  BP: 125/60 (07 Dec 2018 22:30) (125/60 - 195/74)  BP(mean): 87 (07 Dec 2018 22:30) (87 - 149)  ABP: --  ABP(mean): --  RR: 20 (07 Dec 2018 22:30) (16 - 58)  SpO2: 97% (07 Dec 2018 22:30) (93% - 100%)    Physical Examination:    General: moaning     HEENT:  [facility] cath out site clean     PULM: bilateral BS few rhonchi    CVS:  s1 s2 irreg    ABD:      EXT: edematous     SKIN:  anasarca    Neuro:  letharic weak          LABS:                        10.0   12.8  )-----------( 191      ( 07 Dec 2018 22:32 )             29.8     12-07    145  |  100  |  63.0<H>  ----------------------------<  65<L>  3.2<L>   |  21.0<L>  |  2.47<H>    Ca    8.6      07 Dec 2018 03:14  Phos  5.9     12-07  Mg     2.3     12-07    TPro  3.7<L>  /  Alb  1.8<L>  /  TBili  4.9<H>  /  DBili  x   /  AST  329<H>  /  ALT  489<H>  /  AlkPhos  152<H>  12-07          CAPILLARY BLOOD GLUCOSE      POCT Blood Glucose.: 116 mg/dL (07 Dec 2018 18:19)  POCT Blood Glucose.: 55 mg/dL (07 Dec 2018 17:02)  POCT Blood Glucose.: 66 mg/dL (07 Dec 2018 15:34)  POCT Blood Glucose.: 88 mg/dL (07 Dec 2018 05:02)  POCT Blood Glucose.: 83 mg/dL (07 Dec 2018 04:20)    PT/INR - ( 07 Dec 2018 03:14 )   PT: 12.6 sec;   INR: 1.09 ratio         PTT - ( 07 Dec 2018 22:33 )  PTT:38.3 sec    CULTURES:  Culture Results:   No growth (12-03 @ 09:12)  Culture Results:   No growth at 48 hours (12-03 @ 08:47)  Culture Results:   No growth at 5 days. (12-02 @ 17:26)      Medications:  MEDICATIONS  (STANDING):  cefTRIAXone   IVPB 1 Gram(s) IV Intermittent every 24 hours  dextrose 5%. 1000 milliLiter(s) (50 mL/Hr) IV Continuous <Continuous>  dextrose 50% Injectable 12.5 Gram(s) IV Push once  dextrose 50% Injectable 25 Gram(s) IV Push once  dextrose 50% Injectable 25 Gram(s) IV Push once  DULoxetine 60 milliGRAM(s) Oral daily  heparin  Infusion. 700 Unit(s)/Hr (7 mL/Hr) IV Continuous <Continuous>  influenza   Vaccine 0.5 milliLiter(s) IntraMuscular once  metoprolol tartrate 12.5 milliGRAM(s) Oral two times a day  warfarin 5 milliGRAM(s) Oral daily    MEDICATIONS  (PRN):  dextrose 40% Gel 15 Gram(s) Oral once PRN Blood Glucose LESS THAN 70 milliGRAM(s)/deciliter  glucagon  Injectable 1 milliGRAM(s) IntraMuscular once PRN Glucose LESS THAN 70 milligrams/deciliter  heparin  Injectable 6500 Unit(s) IV Push every 6 hours PRN For aPTT less than 40  heparin  Injectable 3000 Unit(s) IV Push every 6 hours PRN For aPTT between 40 - 57  HYDROmorphone  Injectable 0.5 milliGRAM(s) IV Push every 4 hours PRN Pain        12-06 @ 07:01  -  12-07 @ 07:00  --------------------------------------------------------  IN: 459 mL / OUT: 1950 mL / NET: -1491 mL    12-07 @ 07:01  -  12-07 @ 23:48  --------------------------------------------------------  IN: 286 mL / OUT: 920 mL / NET: -634 mL        RADIOLOGY/IMAGING/ECHO    < from: CT Abdomen and Pelvis No Cont (12.07.18 @ 10:59) >  IMPRESSION:     Status post splenectomy with a left upper quadrant surgical drain with   small to mild residual hemoperitoneum markedly decreased as compared to   prior exam.    Left-sided rib fractures. Mild right pleural effusion with small left   pleural effusionwith right lower lobe compressive atelectasis.    No bowel obstruction or gross bowel wall thickening.          Assessment/Plan:    77F NHL in  remission recent PE on NOAC had fall last week in [place],  presented to [facility] [place] 12/1 with abd pain shock splenic lac with hematoma s/p splenectomy 12/1,      Sent to   12/2 in consideration of EKOS as she was found to have RV strain on echo in the setting of what was presumed obstructive shock, with R PA PE.  turned out to be septic shock UTI.  Culture negative here     Extubated off pressors a fib ir rate controlled.  Coumadin loading, bridging with heparin    Her ischemic hepatopathy is improving though T bili is rising.    MELISSA creat stabilizing            CRITICAL CARE TIME SPENT: 37 minutes assessing presenting problems of acute illness, which pose high probability of life threatening deterioration or end organ damage/dysfunction, as well as medical decision making including initiating plan of care, reviewing data, reviewing radiologic exams, discussing with multidisciplinary team,  discussing goals of care with patient/family, and writing this note.  Non-inclusive of procedures performed, Patient is a 77y old  Female who presents with a chief complaint of Pulmonary Emboli with Cor Pulmonale, Splenic laceration s/p splenectomy, hemoperitoneum, acute blood loss anemia, shock (07 Dec 2018 09:28)    PAST MEDICAL & SURGICAL HISTORY:  Anxiety  Gastroesophageal reflux disease, esophagitis presence not specified  Hypertension  Sjogren's syndrome  Non-Hodgkin lymphoma  History of lobectomy of lung: left lower lobe    BENNY POWERS   77y    Female    BRIEF HOSPITAL COURSE:    Patient is a 77y old  Female who presents with a chief complaint of Pulmonary Emboli with Cor Pulmonale, Splenic laceration s/p splenectomy, hemoperitoneum, acute blood loss anemia, shock  MELISSA ischemic hepatitis.  (05 Dec 2018 09:25)    Extubated a few days ago shock resolved.        Review of Systems:    unreliable                       Allergies    Allergy Status Unknown    Intolerances          ICU Vital Signs Last 24 Hrs  T(C): 36.4 (07 Dec 2018 23:19), Max: 36.7 (07 Dec 2018 09:29)  T(F): 97.5 (07 Dec 2018 23:19), Max: 98 (07 Dec 2018 09:29)  HR: 70 (07 Dec 2018 22:30) (66 - 83)  BP: 125/60 (07 Dec 2018 22:30) (125/60 - 195/74)  BP(mean): 87 (07 Dec 2018 22:30) (87 - 149)  ABP: --  ABP(mean): --  RR: 20 (07 Dec 2018 22:30) (16 - 58)  SpO2: 97% (07 Dec 2018 22:30) (93% - 100%)    Physical Examination:    General: moaning     HEENT:  RIJ cath out site clean   NGT     PULM: bilateral BS few rhonchi    CVS:  s1 s2 irreg    ABD: soft   AZAEL    EXT: edematous     SKIN:  anasarca    Neuro:  letharic weak          LABS:                        10.0   12.8  )-----------( 191      ( 07 Dec 2018 22:32 )             29.8     12-07    145  |  100  |  63.0<H>  ----------------------------<  65<L>  3.2<L>   |  21.0<L>  |  2.47<H>    Ca    8.6      07 Dec 2018 03:14  Phos  5.9     12-07  Mg     2.3     12-07    TPro  3.7<L>  /  Alb  1.8<L>  /  TBili  4.9<H>  /  DBili  x   /  AST  329<H>  /  ALT  489<H>  /  AlkPhos  152<H>  12-07          CAPILLARY BLOOD GLUCOSE      POCT Blood Glucose.: 116 mg/dL (07 Dec 2018 18:19)  POCT Blood Glucose.: 55 mg/dL (07 Dec 2018 17:02)  POCT Blood Glucose.: 66 mg/dL (07 Dec 2018 15:34)  POCT Blood Glucose.: 88 mg/dL (07 Dec 2018 05:02)  POCT Blood Glucose.: 83 mg/dL (07 Dec 2018 04:20)    PT/INR - ( 07 Dec 2018 03:14 )   PT: 12.6 sec;   INR: 1.09 ratio         PTT - ( 07 Dec 2018 22:33 )  PTT:38.3 sec    CULTURES:  Culture Results:   No growth (12-03 @ 09:12)  Culture Results:   No growth at 48 hours (12-03 @ 08:47)  Culture Results:   No growth at 5 days. (12-02 @ 17:26)      Medications:  MEDICATIONS  (STANDING):  cefTRIAXone   IVPB 1 Gram(s) IV Intermittent every 24 hours  dextrose 5%. 1000 milliLiter(s) (50 mL/Hr) IV Continuous <Continuous>  dextrose 50% Injectable 12.5 Gram(s) IV Push once  dextrose 50% Injectable 25 Gram(s) IV Push once  dextrose 50% Injectable 25 Gram(s) IV Push once  DULoxetine 60 milliGRAM(s) Oral daily  heparin  Infusion. 700 Unit(s)/Hr (7 mL/Hr) IV Continuous <Continuous>  influenza   Vaccine 0.5 milliLiter(s) IntraMuscular once  metoprolol tartrate 12.5 milliGRAM(s) Oral two times a day  warfarin 5 milliGRAM(s) Oral daily    MEDICATIONS  (PRN):  dextrose 40% Gel 15 Gram(s) Oral once PRN Blood Glucose LESS THAN 70 milliGRAM(s)/deciliter  glucagon  Injectable 1 milliGRAM(s) IntraMuscular once PRN Glucose LESS THAN 70 milligrams/deciliter  heparin  Injectable 6500 Unit(s) IV Push every 6 hours PRN For aPTT less than 40  heparin  Injectable 3000 Unit(s) IV Push every 6 hours PRN For aPTT between 40 - 57  HYDROmorphone  Injectable 0.5 milliGRAM(s) IV Push every 4 hours PRN Pain        12-06 @ 07:01  -  12-07 @ 07:00  --------------------------------------------------------  IN: 459 mL / OUT: 1950 mL / NET: -1491 mL    12-07 @ 07:01  -  12-07 @ 23:48  --------------------------------------------------------  IN: 286 mL / OUT: 920 mL / NET: -634 mL        RADIOLOGY/IMAGING/ECHO    < from: CT Abdomen and Pelvis No Cont (12.07.18 @ 10:59) >  IMPRESSION:     Status post splenectomy with a left upper quadrant surgical drain with   small to mild residual hemoperitoneum markedly decreased as compared to   prior exam.    Left-sided rib fractures. Mild right pleural effusion with small left   pleural effusionwith right lower lobe compressive atelectasis.    No bowel obstruction or gross bowel wall thickening.          Assessment/Plan:    77F NHL in  remission recent PE on NOAC had fall last week in [place],  presented to [facility] [place] 12/1 with abd pain shock splenic lac with hematoma s/p splenectomy 12/1,      Sent to [facility]  12/2 in consideration of EKOS as she was found to have RV strain on echo in the setting of what was presumed obstructive shock, with R PA PE.  turned out to be septic shock UTI.  Culture negative here     Extubated off pressors a fib ir rate controlled.  Coumadin loading, bridging with heparin    Her ischemic hepatopathy is improving though T bili is rising.    MELISSA creat stabilizing      Neuro  encephalopathic,  check ammonia  Cor  HD stable rate controlled afib  on present therapy.  ?? eventual reattempt at D/C cardioversion ,  Pulm  weak WOB OK   Gi  Feeds started via NGT   Renal MELISSA NOG  total body fluid + will try enhanced diuresis   Heme/DVT  full AC for DVT PE  coumadin the heparin  til INR therapeutic   ID  to complete ceftriaxone after 12/8 does owing to hyperbilirubinemia.  Cultures neg here.    ANGIE belcher D/C.    Vaccinations post splenectomy PT discharge.   Endo  hypoglycemic due to low glycogen stores in her stressed liver.    Lines/tubes  R arm midline placed,  no hassan            CRITICAL CARE TIME SPENT: 37 minutes assessing presenting problems of acute illness, which pose high probability of life threatening deterioration or end organ damage/dysfunction, as well as medical decision making including initiating plan of care, reviewing data, reviewing radiologic exams, discussing with multidisciplinary team,  discussing goals of care with patient/family, and writing this note.  Non-inclusive of procedures performed,

## 2018-12-07 NOTE — PROVIDER CONTACT NOTE (EICU) - ASSESSMENT
magnesium 1.7
RN at bedside, assessed on camera opening eyes and arousable. Bedside RN states close to baseline however "a little more lethargic". Repeat fingerstick 83.
Na 145, K 3.2, Serum co2 21, Mag 2.3, Phos 5.9  BUN/Cr 63/2.47), (was 54/2.59 on 12/6/2018)

## 2018-12-07 NOTE — PROGRESS NOTE ADULT - SUBJECTIVE AND OBJECTIVE BOX
HPI/OVERNIGHT EVENTS: Patient seen and examined at bedside this AM. EDUARDO self-removed overnight per nursing reports.  Feeling well this AM. No fevers/chills, no N/V.       Vital Signs Last 24 Hrs  T(C): 36.4 (07 Dec 2018 03:14), Max: 36.8 (06 Dec 2018 11:00)  T(F): 97.5 (07 Dec 2018 03:14), Max: 98.3 (06 Dec 2018 11:00)  HR: 77 (07 Dec 2018 08:00) (63 - 128)  BP: 149/102 (07 Dec 2018 08:00) (132/63 - 195/74)  BP(mean): 118 (07 Dec 2018 08:00) (87 - 149)  RR: 52 (07 Dec 2018 08:00) (15 - 52)  SpO2: 97% (07 Dec 2018 08:00) (88% - 100%)    I&O's Detail    06 Dec 2018 07:01  -  07 Dec 2018 07:00  --------------------------------------------------------  IN:    heparin  Infusion.: 124 mL    Oral Fluid: 260 mL    Solution: 25 mL    Solution: 50 mL  Total IN: 459 mL    OUT:    Bulb: 150 mL    Voided: 1800 mL  Total OUT: 1950 mL    Total NET: -1491 mL      07 Dec 2018 07:01  -  07 Dec 2018 09:20  --------------------------------------------------------  IN:    heparin  Infusion.: 18 mL  Total IN: 18 mL    OUT:    Voided: 200 mL  Total OUT: 200 mL    Total NET: -182 mL      Constitutional: patient resting comfortably in bed, in no acute distress  Respiratory: patient spontaneously breathing  Cardiovascular: regular rate & rhythm   Gastrointestinal: Abdomen soft, non-tender, non-distended, no rebound tenderness / guarding  Incision/Wound: Clean, dry and intact, blisters on lower midline, EDUARDO removed, left dry 4x4 gauze with tape for dressing       LABS:                        9.1    14.9  )-----------( 177      ( 07 Dec 2018 03:14 )             26.6     12-07    145  |  100  |  63.0<H>  ----------------------------<  65<L>  3.2<L>   |  21.0<L>  |  2.47<H>    Ca    8.6      07 Dec 2018 03:14  Phos  5.9     12-07  Mg     2.3     12-07    TPro  3.7<L>  /  Alb  1.8<L>  /  TBili  4.9<H>  /  DBili  x   /  AST  329<H>  /  ALT  489<H>  /  AlkPhos  152<H>  12-07    PT/INR - ( 07 Dec 2018 03:14 )   PT: 12.6 sec;   INR: 1.09 ratio         PTT - ( 07 Dec 2018 03:14 )  PTT:39.6 sec      MEDICATIONS  (STANDING):  cefTRIAXone   IVPB 1 Gram(s) IV Intermittent every 24 hours  dextrose 5%. 1000 milliLiter(s) (50 mL/Hr) IV Continuous <Continuous>  dextrose 50% Injectable 12.5 Gram(s) IV Push once  dextrose 50% Injectable 25 Gram(s) IV Push once  dextrose 50% Injectable 25 Gram(s) IV Push once  DULoxetine 60 milliGRAM(s) Oral daily  heparin  Infusion. 700 Unit(s)/Hr (7 mL/Hr) IV Continuous <Continuous>  influenza   Vaccine 0.5 milliLiter(s) IntraMuscular once  metoprolol tartrate 12.5 milliGRAM(s) Oral two times a day    MEDICATIONS  (PRN):  dextrose 40% Gel 15 Gram(s) Oral once PRN Blood Glucose LESS THAN 70 milliGRAM(s)/deciliter  glucagon  Injectable 1 milliGRAM(s) IntraMuscular once PRN Glucose LESS THAN 70 milligrams/deciliter  heparin  Injectable 6500 Unit(s) IV Push every 6 hours PRN For aPTT less than 40  heparin  Injectable 3000 Unit(s) IV Push every 6 hours PRN For aPTT between 40 - 57  HYDROmorphone  Injectable 0.5 milliGRAM(s) IV Push every 4 hours PRN Pain      MICRO:   Cultures     STUDIES:   EKG, CXR, U/S, CT, MRI

## 2018-12-07 NOTE — CHART NOTE - NSCHARTNOTEFT_GEN_A_CORE
Source: Patient [ ]  Family [x]   other [x] EMR, discussed on rounds    Current Diet: Diet, Dysphagia 1 Pureed-Honey Consistency Fluid:   Supplement Feeding Modality:  Oral  Nepro Cans or Servings Per Day:  1       Frequency:  Two Times a day (12-06-18 @ 10:36)    [ ] nausea  [ ] vomiting [ ] diarrhea [ ] constipation  [x]chewing problems [x] swallowing issues  [ ] other:     PO intake:  < 50% [x]   50-75%  [ ]   %  [ ]  other :    Source for PO intake [ ] Patient [x] family [ ] chart [x] staff [ ] other    Current Weight:   (12/7) 217.6 lbs  (12/5) 221.1 lbs  (12/4) 212.9 lbs  (12/3) 203.2 lbs  (12/2) 181.1 lbs  Weight increase likely related to fluid as pt with 3+ generalized edema, continue to trend and maintain strict Is&Os    Pertinent Medications: MEDICATIONS  (STANDING):  cefTRIAXone   IVPB 1 Gram(s) IV Intermittent every 24 hours  dextrose 5%. 1000 milliLiter(s) (50 mL/Hr) IV Continuous <Continuous>  dextrose 50% Injectable 12.5 Gram(s) IV Push once  dextrose 50% Injectable 25 Gram(s) IV Push once  dextrose 50% Injectable 25 Gram(s) IV Push once  DULoxetine 60 milliGRAM(s) Oral daily  heparin  Infusion. 700 Unit(s)/Hr (7 mL/Hr) IV Continuous <Continuous>  influenza   Vaccine 0.5 milliLiter(s) IntraMuscular once  metoprolol tartrate 12.5 milliGRAM(s) Oral two times a day  potassium chloride   Powder 20 milliEquivalent(s) Oral once    MEDICATIONS  (PRN):  dextrose 40% Gel 15 Gram(s) Oral once PRN Blood Glucose LESS THAN 70 milliGRAM(s)/deciliter  glucagon  Injectable 1 milliGRAM(s) IntraMuscular once PRN Glucose LESS THAN 70 milligrams/deciliter  heparin  Injectable 6500 Unit(s) IV Push every 6 hours PRN For aPTT less than 40  heparin  Injectable 3000 Unit(s) IV Push every 6 hours PRN For aPTT between 40 - 57  HYDROmorphone  Injectable 0.5 milliGRAM(s) IV Push every 4 hours PRN Pain    Pertinent Labs: CBC Full  -  ( 07 Dec 2018 03:14 )  WBC Count : 14.9 K/uL  Hemoglobin : 9.1 g/dL  Hematocrit : 26.6 %  Platelet Count - Automated : 177 K/uL  Mean Cell Volume : 83.6 fl  Mean Cell Hemoglobin : 28.6 pg  Mean Cell Hemoglobin Concentration : 34.2 g/dL  Auto Neutrophil # : x  Auto Lymphocyte # : x  Auto Monocyte # : x  Auto Eosinophil # : x  Auto Basophil # : x  Auto Neutrophil % : x  Auto Lymphocyte % : x  Auto Monocyte % : x  Auto Eosinophil % : x  Auto Basophil % : x    12-07 Na145 mmol/L Glu 65 mg/dL<L> K+ 3.2 mmol/L<L> Cr  2.47 mg/dL<H> BUN 63.0 mg/dL<H> Phos 5.9 mg/dL<H> Alb 1.8 g/dL<L> PAB n/a       Skin: Surgical incision to midline abdomen with AZAEL drain and EDUARDO per documentation    Nutrition focused physical exam conducted - found signs of malnutrition [ ]absent [x]present    Subcutaneous fat loss: [x] Orbital fat pads region, [ ]Buccal fat region, [ ]Triceps region,  [ ]Ribs region    Muscle wasting: [ ]Temples region, [x]Clavicle region, [x]Shoulder region, [ ]Scapula region, [ ]Interosseous region,  [ ]thigh region, [ ]Calf region    Estimated Needs:   [x] no change since previous assessment  [ ] recalculated:     Current Nutrition Diagnosis: Pt now presents with malnutrition (severe, acute) related to inability to consume sufficient protein-energy associated with NHL, s/p splenectomy, multiorgan failure, and lethargy and poor po intake as evidenced by <50% energy intake >/5 days, 3+ generalized edema, mild muscle loss of clavicles and shoulders, and mild fat loss of orbitals noted on NFPE. Spoke with family at bedside, report pt with poor appetite/po intake despite encouragement to eat. They are unaware of pts UBW because she lives in [place]. Pt very lethargic at time of interview and unable to answer questions. Limited NFPE performed and findings consistent with mild muscle/fat loss. Per RN, pt did not take any po today. As discussed on rounds, possible plan for NGT to provide pt with adequate nutrition- agree as pt now meeting criteria for acute malnutrition. Noted elevated Phos (5.9 mg/dL, 6.0mg/dL).    Recommendations:   1) If NGT to be placed, recommend Nepro, initiated at 20 ml/hr and advance 10 ml/hr q4 hrs until goal rate of 55 ml/hr (x20 hrs) to provide 1100 ml, 1980 kcal, 89g protein, 800 ml free water, and >100% of RDIs for vitamins/minerals. Additional free water per MD discretion.  2) Recommend Nephro-Myrtle MVI and vitamin C 500mg daily as feasible.    Monitoring and Evaluation:   [x] PO intake [x] Tolerance to diet prescription [X] Weights  [X] Follow up per protocol [X] Labs

## 2018-12-07 NOTE — PROGRESS NOTE ADULT - PROBLEM SELECTOR PLAN 3
Complains of abdominal pain today, possibly worse than previous days.  Will obtain repeat CT abdomen.  Dilaudid for pain (would like to avoid tylenol until LFTs normalize). Currently on heparin drip, will start transitioning to oral agent if CT abdomen negative.

## 2018-12-08 NOTE — PROVIDER CONTACT NOTE (CRITICAL VALUE NOTIFICATION) - ACTION/TREATMENT ORDERED:
see EMAR
Follow Heparin Protocol
HEPARIN FULL ANTICOAGULATION NOMOGRAM FOLLOWED.
Heparin gtt turned off for 1 hour, will resume at a reduced rate, as per full anticoagulation protocol
Heparin off x 1 hr as per full anticoagulation heparin nomogram ordered.
heparin protocol followed
orders to hold heparin infusion for 2hrs,repeat ptt, then restart heparin at 1000 units/hr
Primary RN aware. PA Jimena aware; replacement

## 2018-12-08 NOTE — PROGRESS NOTE ADULT - PROBLEM SELECTOR PROBLEM 2
Acute respiratory failure, unspecified whether with hypoxia or hypercapnia
Atrial fibrillation with RVR
Atrial fibrillation with RVR
Acute respiratory failure, unspecified whether with hypoxia or hypercapnia
MELISSA (acute kidney injury)
MELISSA (acute kidney injury)

## 2018-12-08 NOTE — PROCEDURE NOTE - NSINDICATIONS_GEN_A_CORE
venous access/antibiotic therapy
critical illness/emergency venous access
respiratory distress/airway protection/critical patient/respiratory failure

## 2018-12-08 NOTE — DISCHARGE NOTE FOR THE EXPIRED PATIENT - OTHER SIGNIFICANT FINDINGS
Attending addendum:  Agree with above account.  After intubation, copious amounts of purulent secretions were suctioned from the endotracheal tube.  Given the hx of rib fractures, the patient's sedentary status and weak ineffective cough, and the CXR findings, I suspect she was developing a pneumonia which is likely the cause of her deterioration the day of her death.  Earlier in the day her oxygen requirements started to increase and her leukocytosis was worsening.   Eventually the she required nonrebreather facemask and the decision was made to intubate the patient after a discussion with the  regarding goals of care.  He initially was reluctant as he felt the patient was nearing the end of her life and he didn't want to prolong suffering.  Nonetheless he and the rest of the family agreed to a trial of intubation for a few days to see if she would improve.  The patient was intubated using etomidate and rocuronium, she was given  a small dose of phenylephrine prior to induction has her systolic pressure was in the low 100's.  She maintained her BP and oxygen saturation throughout, ET tube placement was confirmed with auscultation and end tidal CO2 detection (both colorimetric and eventually waveform capnography). An emergent subclavian central line was placed as she had lost all IV access.  Several minutes after intubation she developed a bradycardic/PEA arrest, later deteriorating into VT and VF, ACLS protocol was initiated and ROSC was obtained.  Intra-arrest echo showed no evidence of pericardial effusion or tamponade, no evidence of RV overload, lung sliding was present ruling out pneumothorax.  Family made the decision not to subject her to further CPR or any escalation of care including pressors.  On exam several hours later the patient had no spontaneous movements, was not triggering the vent, and had fixed and dilated pupils.  Later that night she arrested again and was pronounced dead.  Family was at bedside throughout the day and was kept fully updated.  A  was called as per their request.  expressed gratitude for our care.

## 2018-12-08 NOTE — PROCEDURE NOTE - NSPROCDETAILS_GEN_ALL_CORE
ultrasound assessment/sterile technique, catheter placed/sterile dressing applied/ultrasound guidance
patient pre-oxygenated, tube inserted, placement confirmed
sterile dressing applied/sterile technique, catheter placed/lumen(s) aspirated and flushed/guidewire recovered

## 2018-12-08 NOTE — DISCHARGE NOTE FOR THE EXPIRED PATIENT - HOSPITAL COURSE
77F S/P Submassive PE, Fall, Splenic laceration, Hemorrhagic Shock, S/P Splenectomy, ARF, Respiratory failure, slowly improved and was extubated. Today noted to be more confused, lethargic and becoming increasingly more hypoxic, with worsening respiratory effort. Ultimately required reintubation. Several minutes after intubation, the patient became bradycardic and lost her pulse. The Intubation was uneventful, without any periods of hypoxia or hypotension.  CPR/ACLS were instituted. The patient received several rounds of Epinephrine and 2 minute intervals. She eventually developed a shockable rhythmn of Vfib, and was defibrillated without success, so CPR continued with IV Amiodarone being given, and the  addition of Calcium Chloride as well a NaHCO3. ROSC circulation   was achieved after @ 20 minutes of CPR. The patient converted into a Sinus Tachycardic rhythm and was maintaining her BP on her own.     The family, including the patient's  and several children were present at the bedside and were spoken to in detail.  Goals of care were discussed and they agreed that if her heart were to stop again that CPR should no longer be done and if her BP were to fall that vasopressors should not be added and that care should be directed primarily toward making her comfortable.    The patient again lost her pulse, had no BP, No spontaneous respirations and was pronounced in the presence of her family at 17:10.    The case was discussed with the Medical Examiner and was accepted for autopsy.

## 2018-12-08 NOTE — PROGRESS NOTE ADULT - PROBLEM SELECTOR PLAN 2
continue full ventilatory support.
Slowly resolving.
Slowly resolving.
continue full ventilatory support.

## 2018-12-08 NOTE — PROGRESS NOTE ADULT - SUBJECTIVE AND OBJECTIVE BOX
INTERVAL HPI/OVERNIGHT EVENTS: No acute events overnight.     SUBJECTIVE: Patient confused this AM, likely ICU delirium      MEDICATIONS  (STANDING):  cefTRIAXone   IVPB 1 Gram(s) IV Intermittent every 24 hours  dextrose 5%. 1000 milliLiter(s) (50 mL/Hr) IV Continuous <Continuous>  dextrose 50% Injectable 12.5 Gram(s) IV Push once  dextrose 50% Injectable 25 Gram(s) IV Push once  dextrose 50% Injectable 25 Gram(s) IV Push once  DULoxetine 60 milliGRAM(s) Oral daily  heparin  Infusion. 700 Unit(s)/Hr (7 mL/Hr) IV Continuous <Continuous>  influenza   Vaccine 0.5 milliLiter(s) IntraMuscular once  metoprolol tartrate 12.5 milliGRAM(s) Oral two times a day  potassium chloride    Tablet ER 40 milliEquivalent(s) Oral once  warfarin 5 milliGRAM(s) Oral daily    MEDICATIONS  (PRN):  dextrose 40% Gel 15 Gram(s) Oral once PRN Blood Glucose LESS THAN 70 milliGRAM(s)/deciliter  glucagon  Injectable 1 milliGRAM(s) IntraMuscular once PRN Glucose LESS THAN 70 milligrams/deciliter  heparin  Injectable 6500 Unit(s) IV Push every 6 hours PRN For aPTT less than 40  heparin  Injectable 3000 Unit(s) IV Push every 6 hours PRN For aPTT between 40 - 57  HYDROmorphone  Injectable 0.5 milliGRAM(s) IV Push every 4 hours PRN Pain      Vital Signs Last 24 Hrs  T(C): 36.4 (08 Dec 2018 07:43), Max: 36.7 (07 Dec 2018 09:29)  T(F): 97.5 (08 Dec 2018 07:43), Max: 98 (07 Dec 2018 09:29)  HR: 247 (08 Dec 2018 06:00) (66 - 247)  BP: 125/70 (08 Dec 2018 05:00) (125/60 - 181/76)  BP(mean): 103 (08 Dec 2018 04:00) (85 - 122)  RR: 27 (08 Dec 2018 06:00) (16 - 58)  SpO2: 98% (08 Dec 2018 06:00) (93% - 98%)    Constitutional: patient resting comfortably in bed, in no acute distress  Respiratory: patient spontaneously breathing  Cardiovascular: regular rate & rhythm   Gastrointestinal: Abdomen soft, non-tender, non-distended, no rebound tenderness / guarding  Incision/Wound: Clean, dry and intact, blisters on lower midline, EDUARDO removed, left dry 4x4 gauze with tape for dressing      I&O's Detail    07 Dec 2018 07:01  -  08 Dec 2018 07:00  --------------------------------------------------------  IN:    heparin  Infusion.: 315 mL    Jevity: 40 mL    Solution: 50 mL  Total IN: 405 mL    OUT:    Bulb: 595 mL    Voided: 2460 mL  Total OUT: 3055 mL    Total NET: -2650 mL          LABS:                        10.2   15.4  )-----------( 196      ( 08 Dec 2018 06:03 )             30.7     12-08    147<H>  |  101  |  68.0<H>  ----------------------------<  131<H>  2.7<LL>   |  24.0  |  2.69<H>    Ca    8.7      08 Dec 2018 06:03  Phos  5.5     12-08  Mg     2.3     12-08    TPro  4.4<L>  /  Alb  1.9<L>  /  TBili  6.4<H>  /  DBili  5.5<H>  /  AST  208<H>  /  ALT  361<H>  /  AlkPhos  191<H>  12-08    PT/INR - ( 08 Dec 2018 06:03 )   PT: 13.8 sec;   INR: 1.19 ratio         PTT - ( 08 Dec 2018 06:03 )  PTT:>200.0 sec      RADIOLOGY & ADDITIONAL STUDIES:

## 2018-12-08 NOTE — PROGRESS NOTE ADULT - SUBJECTIVE AND OBJECTIVE BOX
INTERVAL HPI/OVERNIGHT EVENTS: Didn't sleep well, delirious this AM.    On Admission  12-02-18 (6d)  HPI:  78 yo female, PMHx non-Hodgkin's lymphoma, Sjogren's, HTN, GERD, anxiety, s/p LLL lobectomy, who was feeling short of breath mid-November, travelled to [place] by airplane just prior to Thanksgiving, where she developed increasing shortness of breath. Patient presented to ED in [place] with complains of increasing shortness of breath, was admitted with acute PE and started on Eliquis. Patient was discharged, but developed increasing shortness of breath and was readmitted 11/28. While in ED in [place], patient had fall from stretcher while sleeping, per family had chest Xray negative for rib fractures. Patient was d/c with Home O2 and Eliquis and flew back to [place], where she developed increasing abdominal pain. [place] [facility] [place] unwell with worsening abdominal pain. CTA revealed extensive right main pulmonary embolus with clot burden extending through RUL, RML, RLL segmental branches with CT evidence of RV strain. Also revealed grade II splenic lacteration with adjacent hematoma and hemoperitoneum, and posterior L 8-10 mildly displaced rib fractures. Overnight patient developed acute blood loss anemia, shock, and was taken to OR for emergently splenectomy. Patient was transfused 4u PRBC in total and has EDUARDO and AZAEL drain in place. Patient was left intubated from OR. Post-op patient developed acute shock, H/H stable, and was started on Phenylephrine by surgical team. Phenylephrine was converted to Norepinephrine, and fixed-dose Vasopressin was added. Patient was also started on Bicarb gtt for metabolic acidosis. Patient developed AFib with RVR requiring Cardizem. Diagnosed with UTI started on Zosyn (cultures pending). Given extensive clot burden with cor pulmonale, and splenectomy precluding TPA lysis, patient was transferred to [facility] MICU for possible interventional thrombectomy. On arrival to [facility] MICU, patient off sedation able to answer questions. Levophed requirements downtrending with addition of Vasopressin. ATN, UOP slowly improving. Bedside POCUS with good LV contractility hypokinesis at apex, RV dilation. ABG with primary metabolic acidosis, elevated lactate 3.8. FiO2 40% SpO2 100%. Started on sodium bicarb gtt, given 500cc IV fluid bolus and started on maintenance fluids, electrolytes replaced. (02 Dec 2018 15:00)    PAST MEDICAL & SURGICAL HISTORY:  Anxiety  Gastroesophageal reflux disease, esophagitis presence not specified  Hypertension  Sjogren's syndrome  Non-Hodgkin lymphoma  History of lobectomy of lung: left lower lobe      Antimicrobial:  cefTRIAXone   IVPB 1 Gram(s) IV Intermittent every 24 hours    Cardiovascular:  metoprolol tartrate 12.5 milliGRAM(s) Oral two times a day    Pulmonary:    Hematalogic:  heparin  Infusion. 700 Unit(s)/Hr IV Continuous <Continuous>  heparin  Injectable 6500 Unit(s) IV Push every 6 hours PRN  heparin  Injectable 3000 Unit(s) IV Push every 6 hours PRN  warfarin 5 milliGRAM(s) Oral daily    Other:  dexmedetomidine Infusion 0.4 MICROgram(s)/kG/Hr IV Continuous <Continuous>  dextrose 40% Gel 15 Gram(s) Oral once PRN  dextrose 5%. 1000 milliLiter(s) IV Continuous <Continuous>  dextrose 50% Injectable 12.5 Gram(s) IV Push once  dextrose 50% Injectable 25 Gram(s) IV Push once  dextrose 50% Injectable 25 Gram(s) IV Push once  DULoxetine 60 milliGRAM(s) Oral daily  glucagon  Injectable 1 milliGRAM(s) IntraMuscular once PRN  haloperidol     Tablet 2 milliGRAM(s) Oral every 6 hours PRN  HYDROmorphone  Injectable 0.5 milliGRAM(s) IV Push every 4 hours PRN  influenza   Vaccine 0.5 milliLiter(s) IntraMuscular once  potassium chloride    Tablet ER 40 milliEquivalent(s) Oral once      Drug Dosing Weight  Height (cm): 157.48 (02 Dec 2018 22:00)  Weight (kg): 82.5 (02 Dec 2018 13:45)  BMI (kg/m2): 33.3 (02 Dec 2018 22:00)  BSA (m2): 1.84 (02 Dec 2018 22:00)    T(C): 36.4 (12-08-18 @ 07:43), Max: 36.7 (12-07-18 @ 09:29)  HR: 247 (12-08-18 @ 06:00)  BP: 125/70 (12-08-18 @ 05:00)  BP(mean): 103 (12-08-18 @ 04:00)  ABP: --  ABP(mean): --  RR: 27 (12-08-18 @ 06:00)  SpO2: 98% (12-08-18 @ 06:00)          12-07 @ 07:01  -  12-08 @ 07:00  --------------------------------------------------------  IN: 405 mL / OUT: 3055 mL / NET: -2650 mL            PHYSICAL EXAM:    GENERAL: NAD  HEAD:  Atraumatic, normocephalic  EYES: EOMI, PERRL  ENMT:  MMM  NECK:  Supple, normal appearance, trachea midline  NERVOUS SYSTEM:  Awake and alert, groaning that she wants to go home, no focal deficits, confused at times   CHEST/LUNG: Bilateral air entry, no chest deformity, basilar crackles   HEART: Regular rate  ABDOMEN: soft nondistended, no rebound or guarding, AZAEL drain present  EXTREMITIES:  bilat edema   LYMPH:  No lymphadenopathy noted  SKIN:  No visible rashes or skin lesions, good capillary refill    LABS:  CBC Full  -  ( 08 Dec 2018 06:03 )  WBC Count : 15.4 K/uL  Hemoglobin : 10.2 g/dL  Hematocrit : 30.7 %  Platelet Count - Automated : 196 K/uL  Mean Cell Volume : 85.0 fl  Mean Cell Hemoglobin : 28.3 pg  Mean Cell Hemoglobin Concentration : 33.2 g/dL  Auto Neutrophil # : x  Auto Lymphocyte # : x  Auto Monocyte # : x  Auto Eosinophil # : x  Auto Basophil # : x  Auto Neutrophil % : x  Auto Lymphocyte % : x  Auto Monocyte % : x  Auto Eosinophil % : x  Auto Basophil % : x    12-08    147<H>  |  101  |  68.0<H>  ----------------------------<  131<H>  2.7<LL>   |  24.0  |  2.69<H>    Ca    8.7      08 Dec 2018 06:03  Phos  5.5     12-08  Mg     2.3     12-08    TPro  4.4<L>  /  Alb  1.9<L>  /  TBili  6.4<H>  /  DBili  5.5<H>  /  AST  208<H>  /  ALT  361<H>  /  AlkPhos  191<H>  12-08    PT/INR - ( 08 Dec 2018 06:03 )   PT: 13.8 sec;   INR: 1.19 ratio         PTT - ( 08 Dec 2018 06:03 )  PTT:>200.0 sec        RADIOLOGY personally reviewed    GOALS OF CARE DISCUSSION WITH PATIENT/FAMILY/PROXY: multiple family members updated at bedside

## 2018-12-08 NOTE — PROVIDER CONTACT NOTE (CRITICAL VALUE NOTIFICATION) - NAME OF MD/NP/PA/DO NOTIFIED:
CCPA SARAH COLE
DAVION Sosa
Dr. Reyse
ESTRELLA Hess
JUAN Hess
Jenni MARTINEZ
MARCO Montalvo  and JUAN Dennis
dr chi greenfield
JUAN landaverde

## 2018-12-08 NOTE — PROGRESS NOTE ADULT - PROVIDER SPECIALTY LIST ADULT
Cardiology
Cardiology
Critical Care
Surgery
Critical Care
Surgery
Surgery
Critical Care

## 2018-12-08 NOTE — PROGRESS NOTE ADULT - PROBLEM SELECTOR PROBLEM 1
Septic shock due to Escherichia coli
Acute respiratory failure, unspecified whether with hypoxia or hypercapnia
Acute respiratory failure, unspecified whether with hypoxia or hypercapnia
Septic shock due to Escherichia coli
Urinary tract infection
Urinary tract infection

## 2018-12-08 NOTE — PROGRESS NOTE ADULT - ASSESSMENT
77y Female with respiratory failure, cor pulmonale, a fib, splenic laceration s/p splenectomy, Shock, MELISSA, and UTI, now off pressors and extubated    Plan:  - EDUARDO removed  - f/u AZAEL output  -care per primary team  -surgery to follow for post-splenectomy management
77y Female with respiratory failure, cor pulmonale, a fib, splenic laceration s/p splenectomy, Shock, MELISSA, and UTI, now off pressors and extubated    Plan:  - dressing changes  - f/u AZAEL output, strip TID  -care per primary team  -post-splenectomy vaccines  -concern for ICU delirium  -care per primary team
PE -> anticoagulation -> fall -> rib fx -> splenic lac -> hypotensive (originally thought to potentially be obstructive in nature from PE - also hemorrhagic from splenic lac); turned out to be likely from E Coli UTI/ septic shock.
1: Shock: : Mostly distributive and hypovolemic +/- obstructive   2: Subacute PE with Cor pulmonale   3: Multiorgan failure: MELISSA, Acute hypoxic respiratory failure, Acute liver failure (elevated Nh4 and INR)  4: Acute UTI, R/O bacteremia   5: Splenic Laceration with hemoperitoneum s/p splenectomy   6: Acute blood loss anemia  7: Paroxysmal Afib with RVR    Patient seen and examined    Neuro:   OOB to chair/ PT as tolerated  Day-night scheduling     Cardiovascular:   Decreasing doses of Levophed, will d/c Vasopressin now, added midodrine and IVF boluses intermittently to target MAP >/ 65  C/w Merik protocol    Failed BEATA, HR better controlled, added PO BB, can wean off esmolol gtt    Resp/Chest:   S/p successful extubation on 12/5     Gi/Nutrition:   Swallow eval and then will advance diet with aspiration precautions   No 72 hour NAC protocol   LFTs improving  Will get LA, NH4, INR levels for today along with LFTs  Sx team following post- splenectomy care for now    ID:   Cx from [facility] (urine and blood) NTD but awaiting from [facility]  C/w Zosyn in the mean time   Probiotics   Pre- splenectomy vaccination record: will try to obtain and would need to give rest as per protocol (pneumovax-23, Hib, Meningococcal polysaccharide at post-operative day 14)    Nephro/Electrolyte/Acid-Base:   D/c Mccann   Close UO, Cr and electrolyte  monitoring and correction as needed     Endocrinology:   No active issues     Haem/Oncology:   Getting 1 unit of PRBC  C/w Heparin gtt for now
1: Shock: : Mostly distributive and hypovolemic +/- obstructive   2: Subacute PE with Cor pulmonale   3: Multiorgan failure: MELISSA, Acute hypoxic respiratory failure, Acute liver failure (elevated Nh4 and INR)  4: Acute UTI, R/O bacteremia   5: Splenic Laceration with hemoperitoneum s/p splenectomy   6: Acute blood loss anemia  7: Paroxysmal Afib with RVR    Patient seen and examined    Neuro:   OOB to chair/ PT as tolerated  Day-night scheduling     Cardiovascular:   S/p Pressors since yesterday   Will d/c midodrine today as she is getting hypertensive and add PRN antihypertensive for today and scheduled tomorrow is hypertensive off of midodrine   Finishing Merik protocol  today   Failed BEATA, HR better controlled, added PO BB, s/p esmolol gtt since yesterday     Resp/Chest:   S/p successful extubation on 12/5     Gi/Nutrition:   Swallow eval-> will advance diet with aspiration precautions, adding nepro   S/p  72 hour NAC protocol finished on 12/5  LFTs improving along with INR  Sx team following post- splenectomy care for now    ID:   Urine cx: E.Coli (sensitive to both FQs and CTX, changing from Pipe-tazo to CTX for total 7-8 days abx; Blood cx from [facility] NTD  Probiotics   Pre- splenectomy vaccination record: will try to obtain and would need to give rest as per protocol (pneumovax-23, Hib, Meningococcal polysaccharide at post-operative day 14)  Will attempt getting both A-line and TLC out today     Nephro/Electrolyte/Acid-Base:   D/c Mccann   Close UO, Cr and electrolyte  monitoring and correction as needed   Nephrology following, although Cr increased, making good urine     Endocrinology:   No active issues     Haem/Oncology:   S/p  PRBC yesterday   C/w Heparin gtt for now, eventually would need to be transitioned to NOAC prior to D/c    Hopefully can be transferred out of MICU in next 12-24 hours
76 yo female with extensive pmh including NHL, sjograns syndrome, HTN, GERD, anxiety, PE diagnosed in November in [place] treated with eliquis, suffered a fall and subsequently developed splenic laceration/hemoperitoneum/rib fractures, underwent splenectomy at [facility], transferred to [facility] with shock (distributive, possibly from UTI), multiorgan failure, now extubated and off pressors.      Long discussion with daughter and daughter-in-law at bedside.  Although patient is showing improvement with regards to labs and organ function, she is quite delirious and seems to have poor appetite or desire to recover.  Overall prognosis is guarded.
76 yo female with extensive pmh including NHL, sjograns syndrome, HTN, GERD, anxiety, PE diagnosed in November in [place] treated with eliquis, suffered a fall and subsequently developed splenic laceration/hemoperitoneum/rib fractures, underwent splenectomy at [facility], transferred to [facility] with shock (distributive, possibly from UTI), multiorgan failure, now extubated and off pressors.      Today patient is still delirious.  Her LFTs are improving with the exception of her bilirubin which is rising.
77y Female with respiratory failure, cor pulmonale, a fib, splenic laceration s/p splenectomy, Shock, MELISSA, and UTI    PLAN:  -f/u CK to r/o rhabdo  -serial abdominal exams  -wean pressors  -care per primary team  -surgery to follow for post-splenectomy management
77y Female with respiratory failure, cor pulmonale, a fib, splenic laceration s/p splenectomy, Shock, MELISSA, and UTI    PLAN:  -trend lactate  -serial abdominal exams  -wean pressors  -care per primary team  -surgery to follow for post-splenectomy management
77y Female with respiratory failure, cor pulmonale, a fib, splenic laceration s/p splenectomy, Shock, MELISSA, and UTI, now off pressors and extubated    Plan:  - keep EDUARDO for a total of 7 days, will remove in 2 days  - f/u AZAEL output  -care per primary team  -surgery to follow for post-splenectomy management
78 y/o female with PMH of NH lymphoma, Sjogren, HTN, s/p LLL lobectomy , presented to hospital in [place] with acute PE and discharged on AC , readmitted again with SOB and had a fall in ED . Flew back to [place] and was admitted to [facility] where she was found to have right main pulmonary artery PE with evidence of RV strain and splenic laceration s/p splenectomy and transferred here for interventional management of PE .   Patient was in shock at [facility] and was placed on pressors and went into Afib with RVR as well   She has elevated lactate, shock liver and MELISSA   She is currently on 2 pressors ( Levophed/vasopressin) , esmolol for rate control, in addition to heparin for anticoagulation  Unable to pass BEATA probe today but rate is better controlled  Urosepsis      Recommend  Replace esmolol with oral BB or IV lopressor q 6 hours  continue heparin anticoagulation   continue pressors weaning and volume resuscitation .  ABx for Urosepsis
78 y/o female with PMH of NH lymphoma, Sjogren, HTN, s/p LLL lobectomy , presented to hospital in [place] with acute PE and discharged on AC , readmitted again with SOB and had a fall in ED . Flew back to [place] and was admitted to [facility] where she was found to have right main pulmonary artery PE with evidence of RV strain and splenic laceration s/p splenectomy and transferred here for interventional management of PE .   Patient was in shock at [facility] and was placed on pressors and went into Afib with RVR as well   She has elevated lactate, shock liver and MELISSA   She is currently on 2 pressors ( Levophed/vasopressin) , Milrinone and esmolol for rate control, in addition to heparin for anticoagulation  Concern for sepsis with elevated procalcitonin and + urine culture , started on abx by ICU team  Normal LV function by echo and moderate RV dysfunction with mild PH    Recommend  Wean Milrinone given normal LV function and recovering RV function from PE , also likely causing Afib with RVR   Not a candidate for mechanical thrombectomy (unilateral PE with recovering RV function and other etiology for shock)   continue heparin anticoagulation   continue pressors and volume resuscitation .  Consider BEATA cardioversion given AFib with RVR maybe contributing to hypotension with ability to wean pressors if converted back to NSR , Esmolol ok for now for rate control  Discussed with ICU team and family
PE -> anticoagulation -> fall -> rib fx -> splenic lac -> hypotensive (originally thought to potentially be obstructive in nature from PE - also hemorrhagic from splenic lac); turned out to be likely from E Coli UTI/ septic shock.

## 2018-12-08 NOTE — PROGRESS NOTE ADULT - NSHPATTENDINGPLANDISCUSS_GEN_ALL_CORE
Family, ACS team, all questions answered.
one of the daughters and  at bedside
one of the daughters and  at bedside

## 2018-12-08 NOTE — PROGRESS NOTE ADULT - PROBLEM SELECTOR PLAN 5
zosyn for now; call ruma today for susceptibility results.
Will start nocturnal precedex, PRN haldol during daytime.
dwayne

## 2018-12-08 NOTE — PROVIDER CONTACT NOTE (CRITICAL VALUE NOTIFICATION) - PERSON GIVING RESULT:
APTT greater thank 200
Angela
Liam Mejía- 
Oliverio Mckenna.... LAB
SWETA HELM
Stiven/ Mckenzie Clark
Willard Madison
shekhar wagner tech
LAB Jaky Lindsay

## 2018-12-08 NOTE — PROGRESS NOTE ADULT - PROBLEM SELECTOR PLAN 4
optimize perfusion. oliguric, creatinine continues to slowly rise.
Rising bilirubin, will obtain RUQ US to rule out acalculous cholecystitis.  Also will stop ceftriaxone after todays dose.
optimize perfusion.
Complains of abdominal pain today, possibly worse than previous days.  Will obtain repeat CT abdomen.  Dilaudid for pain (would like to avoid tylenol until LFTs normalize).

## 2018-12-08 NOTE — PROGRESS NOTE ADULT - ATTENDING COMMENTS
Increased pressor requirement yesterday but decreasing again.      Abd soft but tender.  Serous drainage in LUQ AZAEL.      GAP acidosis remains - recommend f/u lactate as had not cleared. Would not feed GI tract at this time given increased pressor requirement -  concern for inducible ischemia.  Will check amylase on drain.  If does not suggest pancreatic fistula will remove.
Patient seen and examined this am on morning  rounds,.  somnolent and moaning   abdomen is soft, staples in place, drain in place clear output with scan clots .  She should keep drain for pancreatic leak.  we shall follow with you.  enteral nutrition/
Significant reduction in pressor requirement this AM.  Minimal serosanguinous drainage in AZAEL.  Abd soft.      senior care requesting to start feeding.  Have discussed risks/benefits with Dr. Blanca.  We are in agreement that trophic feedings appropriate at this time.  Will reevaluate based on clinical parameters for increases or holds.
multiple updates to large family.  Evaluate to extubate patient once she wakes up from her conscious sedation from BEATA.  Continue anticoagulation for PE and AF  trend lactate  titrate pressors as required for perfusion.
multiple updates to large family.

## 2018-12-08 NOTE — CHART NOTE - NSCHARTNOTEFT_GEN_A_CORE
Critical Care  Cardiac Arrest Note    17F S/P Submassive PE, Fall, Splenic laceration, Hemorrhagic Shock, S/P Splenectomy, ARF, Respiratory failure, slowly improved and was extubated. Today noted to be more confused, lethargic and becoming increasingly more hypoxic, with worsening respiratory effort. Ultimately required reintubation. Several minutes after intubation, the patient became bradycardic and lost her pulse. The Intubation was uneventful, without any periods of hypoxia or hypotension.  CPR/ACLS were instituted. The patient received several rounds of Epinephrine and 2 minute intervals. She eventually developed a shockable rhythmn of Vfib, and was defibrillated without success, so CPR continued with IV Amiodarone being given, and the  addition of Calcium Chloride as well a NaHCO3. ROSC circulation   was achieved after @ 20 minutes of CPR. The patient converted into a Sinus Tachycardic rhythm and was maintaining her BP on her own.     The family, including the patient's  and several children were present at the bedside and were spoken to in detail.  Goals of care were discussed and they agreed that if her heart were to stop again that CPR should no longer be done and if her BP were to fall that vasopressors should not be added and that care should be directed primarily toward making her comfortable.

## 2018-12-08 NOTE — PROGRESS NOTE ADULT - PROBLEM SELECTOR PLAN 1
UTI; piperacillin/tazobactam  Vitamin C/thiamine/hydrocortisone  continue titrating norepinephrine infusion to optimize perfusion; now at 0.25 mcg/kg/min.  continue vasopressin infusion; will be last to come off.  Will need to contact [facility]
Currently on ceftriaxone, recent cultures negative, no longer on pressors.    D/c abx after today's dose
Currently on ceftriaxone, recent cultures negative, no longer on pressors.    Plan to d/c abx tomorrow as bilirubin is rising.
UTI; piperacillin/tazobactam  Vitamin C/thiamine/hydrocortisone  continue titrating norepinephrine infusion to optimize perfusion  continue vasopressin infusion  stop milrinone; EF OK no significant RV strian.

## 2018-12-08 NOTE — PROCEDURE NOTE - NSPOSTCAREGUIDE_GEN_A_CORE
Report to CORINNE Butts.    
Care for catheter as per unit/ICU protocols
Verbal/written post procedure instructions were given to patient/caregiver
Verbal/written post procedure instructions were given to patient/caregiver

## 2019-12-27 NOTE — PROGRESS NOTE ADULT - SUBJECTIVE AND OBJECTIVE BOX
Patient is a 77y old  Female who presents with a chief complaint of Pulmonary Emboli with Cor Pulmonale, Splenic laceration s/p splenectomy, hemoperitoneum, acute blood loss anemia, shock (03 Dec 2018 19:07)      BRIEF HOSPITAL COURSE: Admitted a month ago with PE to OSH -- anticoagulation, had a fall, subsequently found to have rib fx and splenic lac, s/p splenectomy at [facility]; admitted to [facility] for consideration of advanced therapies for PE.    Events last 24 hours: Remains in shock, has responded to esmolol infusion; attempt at BEATA was unsuccessful due to anatomy, so no cardioversion was performed.  Norepinephrine infusion continues, vasopressin continues.    PAST MEDICAL & SURGICAL HISTORY:  Anxiety  Gastroesophageal reflux disease, esophagitis presence not specified  Hypertension  Sjogren's syndrome  Non-Hodgkin lymphoma  History of lobectomy of lung: left lower lobe        Medications:  MEDICATIONS  (STANDING):  acetylcysteine IVPB 12 Gram(s) IV Intermittent every 24 hours  ascorbic acid IVPB 1500 milliGRAM(s) IV Intermittent every 6 hours  chlorhexidine 0.12% Liquid 15 milliLiter(s) Swish and Spit two times a day  dexmedetomidine Infusion 0.3 MICROgram(s)/kG/Hr (6.188 mL/Hr) IV Continuous <Continuous>  dextrose 5%. 1000 milliLiter(s) (50 mL/Hr) IV Continuous <Continuous>  dextrose 50% Injectable 12.5 Gram(s) IV Push once  dextrose 50% Injectable 25 Gram(s) IV Push once  dextrose 50% Injectable 25 Gram(s) IV Push once  esMOLOL  Infusion 50 MICROgram(s)/kG/Min (24.75 mL/Hr) IV Continuous <Continuous>  heparin  Infusion. 700 Unit(s)/Hr (7 mL/Hr) IV Continuous <Continuous>  hydrocortisone sodium succinate Injectable 50 milliGRAM(s) IV Push every 6 hours  influenza   Vaccine 0.5 milliLiter(s) IntraMuscular once  insulin Infusion 8 Unit(s)/Hr (8 mL/Hr) IV Continuous <Continuous>  multiple electrolytes Injection Type 1 1000 milliLiter(s) (70 mL/Hr) IV Continuous <Continuous>  norepinephrine Infusion 0.04 MICROgram(s)/kG/Min (6.188 mL/Hr) IV Continuous <Continuous>  pantoprazole  Injectable 40 milliGRAM(s) IV Push daily  piperacillin/tazobactam IVPB. 3.375 Gram(s) IV Intermittent every 8 hours  thiamine IVPB 200 milliGRAM(s) IV Intermittent <User Schedule>  vasopressin Infusion 0.04 Unit(s)/Min (2.4 mL/Hr) IV Continuous <Continuous>    MEDICATIONS  (PRN):  dextrose 40% Gel 15 Gram(s) Oral once PRN Blood Glucose LESS THAN 70 milliGRAM(s)/deciliter  fentaNYL    Injectable 50 MICROGram(s) IV Push every 2 hours PRN Moderate Pain (4 - 6)  glucagon  Injectable 1 milliGRAM(s) IntraMuscular once PRN Glucose LESS THAN 70 milligrams/deciliter  heparin  Injectable 6500 Unit(s) IV Push every 6 hours PRN For aPTT less than 40  heparin  Injectable 3000 Unit(s) IV Push every 6 hours PRN For aPTT between 40 - 57      Mode: AC/ CMV (Assist Control/ Continuous Mandatory Ventilation)  RR (machine): 18  TV (machine): 340  FiO2: 30  PEEP: 5  MAP: 8  PIP: 13      ICU Vital Signs Last 24 Hrs  T(C): 36.5 (04 Dec 2018 08:00), Max: 38.7 (03 Dec 2018 15:44)  T(F): 97.7 (04 Dec 2018 08:00), Max: 101.7 (03 Dec 2018 15:44)  HR: 106 (04 Dec 2018 15:24) (69 - 125)  BP: --  BP(mean): --  ABP: 110/49 (04 Dec 2018 13:00) (99/41 - 195/65)  ABP(mean): 64 (04 Dec 2018 13:00) (57 - 98)  RR: 21 (04 Dec 2018 13:00) (17 - 31)  SpO2: 95% (04 Dec 2018 15:24) (80% - 100%)    ABG - ( 03 Dec 2018 05:51 )  pH, Arterial: 7.43  pH, Blood: x     /  pCO2: 31    /  pO2: 87    / HCO3: 22    / Base Excess: -3.0  /  SaO2: 98                I&O's Summary    03 Dec 2018 07:01  -  04 Dec 2018 07:00  --------------------------------------------------------  IN: 7073.1 mL / OUT: 1220 mL / NET: 5853.1 mL    04 Dec 2018 07:01  -  04 Dec 2018 15:38  --------------------------------------------------------  IN: 1004.4 mL / OUT: 200 mL / NET: 804.4 mL            LABS:                                   7.9    21.4  )-----------( 172      ( 04 Dec 2018 05:38 )             23.2   12-04    137  |  95<L>  |  37.0<H>  ----------------------------<  80  4.4   |  20.0<L>  |  2.10<H>    Ca    7.1<L>      04 Dec 2018 05:38  Phos  5.1     12-04  Mg     2.1     12-04    TPro  3.3<L>  /  Alb  1.4<L>  /  TBili  3.2<H>  /  DBili  x   /  AST  2165<H>  /  ALT  1607<H>  /  AlkPhos  101  12-04      CAPILLARY BLOOD GLUCOSE  91 (03 Dec 2018 07:00)      POCT Blood Glucose.: 148 mg/dL (03 Dec 2018 20:01)    PT/INR - ( 03 Dec 2018 08:46 )   PT: 48.2 sec;   INR: 4.01 ratio         PTT - ( 03 Dec 2018 14:31 )  PTT:>200.0 sec  Urinalysis Basic - ( 02 Dec 2018 17:43 )    Color: [place] / Appearance: Slightly Turbid / S.010 / pH: x  Gluc: x / Ketone: Trace  / Bili: Small / Urobili: 4 mg/dL   Blood: x / Protein: 100 mg/dL / Nitrite: Negative   Leuk Esterase: Small / RBC: 25-50 /HPF / WBC 6-10   Sq Epi: x / Non Sq Epi: Few / Bacteria: Few      CULTURES:      Physical Examination:    General: No acute distress.  Opens eyes to voice, doesn't follow commands    HEENT: Pupils equal, reactive to light.  Symmetric.    PULM: Clear to auscultation bilaterally, no significant sputum production    NECK: Supple, no lymphadenopathy, trachea midline    CVS: irreg irreg    ABD: Soft, nondistended, nontender, normoactive bowel sounds, no masses; AZAEL with minimal bloody drainage    EXT: No edema, nontender    SKIN: Warm and well perfused, no rashes noted.    NEURO: somnulent but opens eyes to voice.    DEVICES: L radial arterial catheter, RIJ CVC ([facility]) needed for shock.    CRITICAL CARE TIME SPENT: 55 - c/w PPI as above

## 2024-03-30 NOTE — PROGRESS NOTE ADULT - SUBJECTIVE AND OBJECTIVE BOX
INTERVAL HPI/OVERNIGHT EVENTS: Patient was agitated and uncomfortable overnight, pulling at lines, complaining of general pain, delirious.      On Admission  12-02-18 (5d)  HPI:  76 yo female, PMHx non-Hodgkin's lymphoma, Sjogren's, HTN, GERD, anxiety, s/p LLL lobectomy, who was feeling short of breath mid-November, travelled to [place] by airplane just prior to Thanksgiving, where she developed increasing shortness of breath. Patient presented to ED in [place] with complains of increasing shortness of breath, was admitted with acute PE and started on Eliquis. Patient was discharged, but developed increasing shortness of breath and was readmitted 11/28. While in ED in [place], patient had fall from stretcher while sleeping, per family had chest Xray negative for rib fractures. Patient was d/c with Home O2 and Eliquis and flew back to [place], where she developed increasing abdominal pain. [place] [facility] [place] unwell with worsening abdominal pain. CTA revealed extensive right main pulmonary embolus with clot burden extending through RUL, RML, RLL segmental branches with CT evidence of RV strain. Also revealed grade II splenic lacteration with adjacent hematoma and hemoperitoneum, and posterior L 8-10 mildly displaced rib fractures. Overnight patient developed acute blood loss anemia, shock, and was taken to OR for emergently splenectomy. Patient was transfused 4u PRBC in total and has EDUARDO and AZAEL drain in place. Patient was left intubated from OR. Post-op patient developed acute shock, H/H stable, and was started on Phenylephrine by surgical team. Phenylephrine was converted to Norepinephrine, and fixed-dose Vasopressin was added. Patient was also started on Bicarb gtt for metabolic acidosis. Patient developed AFib with RVR requiring Cardizem. Diagnosed with UTI started on Zosyn (cultures pending). Given extensive clot burden with cor pulmonale, and splenectomy precluding TPA lysis, patient was transferred to [facility] MICU for possible interventional thrombectomy. On arrival to [facility]U, patient off sedation able to answer questions. Levophed requirements downtrending with addition of Vasopressin. ATN, UOP slowly improving. Bedside POCUS with good LV contractility hypokinesis at apex, RV dilation. ABG with primary metabolic acidosis, elevated lactate 3.8. FiO2 40% SpO2 100%. Started on sodium bicarb gtt, given 500cc IV fluid bolus and started on maintenance fluids, electrolytes replaced. (02 Dec 2018 15:00)    PAST MEDICAL & SURGICAL HISTORY:  Anxiety  Gastroesophageal reflux disease, esophagitis presence not specified  Hypertension  Sjogren's syndrome  Non-Hodgkin lymphoma  History of lobectomy of lung: left lower lobe      Antimicrobial:  cefTRIAXone   IVPB 1 Gram(s) IV Intermittent every 24 hours    Cardiovascular:  metoprolol tartrate 12.5 milliGRAM(s) Oral two times a day    Pulmonary:    Hematalogic:  heparin  Infusion. 700 Unit(s)/Hr IV Continuous <Continuous>  heparin  Injectable 6500 Unit(s) IV Push every 6 hours PRN  heparin  Injectable 3000 Unit(s) IV Push every 6 hours PRN    Other:  dextrose 40% Gel 15 Gram(s) Oral once PRN  dextrose 5%. 1000 milliLiter(s) IV Continuous <Continuous>  dextrose 50% Injectable 12.5 Gram(s) IV Push once  dextrose 50% Injectable 25 Gram(s) IV Push once  dextrose 50% Injectable 25 Gram(s) IV Push once  DULoxetine 60 milliGRAM(s) Oral daily  glucagon  Injectable 1 milliGRAM(s) IntraMuscular once PRN  HYDROmorphone  Injectable 0.5 milliGRAM(s) IV Push every 4 hours PRN  influenza   Vaccine 0.5 milliLiter(s) IntraMuscular once      Drug Dosing Weight  Height (cm): 157.48 (02 Dec 2018 22:00)  Weight (kg): 82.5 (02 Dec 2018 13:45)  BMI (kg/m2): 33.3 (02 Dec 2018 22:00)  BSA (m2): 1.84 (02 Dec 2018 22:00)    T(C): 36.4 (12-07-18 @ 03:14), Max: 36.8 (12-06-18 @ 11:00)  HR: 77 (12-07-18 @ 08:00)  BP: 149/102 (12-07-18 @ 08:00)  BP(mean): 118 (12-07-18 @ 08:00)  ABP: 145/79 (12-06-18 @ 18:00)  ABP(mean): 107 (12-06-18 @ 18:00)  RR: 52 (12-07-18 @ 08:00)  SpO2: 97% (12-07-18 @ 08:00)          12-06 @ 07:01  -  12-07 @ 07:00  --------------------------------------------------------  IN: 459 mL / OUT: 1950 mL / NET: -1491 mL            PHYSICAL EXAM:    GENERAL: NAD  HEAD:  Atraumatic, normocephalic  EYES: EOMI, PERRL, sclera and conjunctiva clear  ENMT:  slightly dry oral mucosa  NECK:  Supple, normal appearance, trachea midline  NERVOUS SYSTEM:  Awake, opens eyes to command and follows direction, does not want to participate in interview, symmetric strength in all extremities    CHEST/LUNG: Bilateral air entry, no chest deformity, no crackles or wheeze  HEART: Regular rate, regular rhythm  ABDOMEN: Soft, nondistended, hypoactive bowel sounds.  AZAEL drain with reddish drainage.  no rebound or guarding. mild tenderness to palpation.  EXTREMITIES:  bilat edema, no clubbing, no cyanosis.  LYMPH:  No lymphadenopathy noted  SKIN:   good capillary refill, no overt rash  PSYCH: appropriate affect and behavior    LABS:  CBC Full  -  ( 07 Dec 2018 03:14 )  WBC Count : 14.9 K/uL  Hemoglobin : 9.1 g/dL  Hematocrit : 26.6 %  Platelet Count - Automated : 177 K/uL  Mean Cell Volume : 83.6 fl  Mean Cell Hemoglobin : 28.6 pg  Mean Cell Hemoglobin Concentration : 34.2 g/dL  Auto Neutrophil # : x  Auto Lymphocyte # : x  Auto Monocyte # : x  Auto Eosinophil # : x  Auto Basophil # : x  Auto Neutrophil % : x  Auto Lymphocyte % : x  Auto Monocyte % : x  Auto Eosinophil % : x  Auto Basophil % : x    12-07    145  |  100  |  63.0<H>  ----------------------------<  65<L>  3.2<L>   |  21.0<L>  |  2.47<H>    Ca    8.6      07 Dec 2018 03:14  Phos  5.9     12-07  Mg     2.3     12-07    TPro  3.7<L>  /  Alb  1.8<L>  /  TBili  4.9<H>  /  DBili  x   /  AST  329<H>  /  ALT  489<H>  /  AlkPhos  152<H>  12-07    PT/INR - ( 07 Dec 2018 03:14 )   PT: 12.6 sec;   INR: 1.09 ratio         PTT - ( 07 Dec 2018 03:14 )  PTT:39.6 sec        RADIOLOGY personally reviewed Negative